# Patient Record
Sex: MALE | Race: WHITE | NOT HISPANIC OR LATINO | URBAN - METROPOLITAN AREA
[De-identification: names, ages, dates, MRNs, and addresses within clinical notes are randomized per-mention and may not be internally consistent; named-entity substitution may affect disease eponyms.]

---

## 2021-03-30 ENCOUNTER — INPATIENT (INPATIENT)
Facility: HOSPITAL | Age: 86
LOS: 7 days | Discharge: HOME HEALTH | DRG: 698 | End: 2021-04-07
Attending: INTERNAL MEDICINE | Admitting: INTERNAL MEDICINE
Payer: MEDICARE

## 2021-03-30 VITALS
DIASTOLIC BLOOD PRESSURE: 68 MMHG | SYSTOLIC BLOOD PRESSURE: 128 MMHG | RESPIRATION RATE: 26 BRPM | HEART RATE: 112 BPM | TEMPERATURE: 101 F | WEIGHT: 149.91 LBS | OXYGEN SATURATION: 95 %

## 2021-03-30 LAB
ALBUMIN SERPL ELPH-MCNC: 3.1 G/DL — LOW (ref 3.4–5)
ALP SERPL-CCNC: 126 U/L — HIGH (ref 40–120)
ALT FLD-CCNC: 27 U/L — SIGNIFICANT CHANGE UP (ref 12–42)
ANION GAP SERPL CALC-SCNC: 11 MMOL/L — SIGNIFICANT CHANGE UP (ref 9–16)
APPEARANCE UR: SIGNIFICANT CHANGE UP
APTT BLD: 31.4 SEC — SIGNIFICANT CHANGE UP (ref 27.5–35.5)
AST SERPL-CCNC: 24 U/L — SIGNIFICANT CHANGE UP (ref 15–37)
B PERT DNA SPEC QL NAA+PROBE: SIGNIFICANT CHANGE UP
BACTERIA # UR AUTO: ABNORMAL /HPF
BASOPHILS # BLD AUTO: 0.03 K/UL — SIGNIFICANT CHANGE UP (ref 0–0.2)
BASOPHILS NFR BLD AUTO: 0.3 % — SIGNIFICANT CHANGE UP (ref 0–2)
BILIRUB SERPL-MCNC: 0.5 MG/DL — SIGNIFICANT CHANGE UP (ref 0.2–1.2)
BILIRUB UR-MCNC: NEGATIVE — SIGNIFICANT CHANGE UP
BUN SERPL-MCNC: 19 MG/DL — SIGNIFICANT CHANGE UP (ref 7–23)
C PNEUM DNA SPEC QL NAA+PROBE: SIGNIFICANT CHANGE UP
CALCIUM SERPL-MCNC: 9.3 MG/DL — SIGNIFICANT CHANGE UP (ref 8.5–10.5)
CHLORIDE SERPL-SCNC: 104 MMOL/L — SIGNIFICANT CHANGE UP (ref 96–108)
CO2 SERPL-SCNC: 25 MMOL/L — SIGNIFICANT CHANGE UP (ref 22–31)
COLOR SPEC: YELLOW — SIGNIFICANT CHANGE UP
CREAT SERPL-MCNC: 1.33 MG/DL — HIGH (ref 0.5–1.3)
DIFF PNL FLD: ABNORMAL
EOSINOPHIL # BLD AUTO: 0.02 K/UL — SIGNIFICANT CHANGE UP (ref 0–0.5)
EOSINOPHIL NFR BLD AUTO: 0.2 % — SIGNIFICANT CHANGE UP (ref 0–6)
FLUAV H1 2009 PAND RNA SPEC QL NAA+PROBE: SIGNIFICANT CHANGE UP
FLUAV H1 RNA SPEC QL NAA+PROBE: SIGNIFICANT CHANGE UP
FLUAV H3 RNA SPEC QL NAA+PROBE: SIGNIFICANT CHANGE UP
FLUAV SUBTYP SPEC NAA+PROBE: SIGNIFICANT CHANGE UP
FLUBV RNA SPEC QL NAA+PROBE: SIGNIFICANT CHANGE UP
GLUCOSE SERPL-MCNC: 156 MG/DL — HIGH (ref 70–99)
GLUCOSE UR QL: NEGATIVE — SIGNIFICANT CHANGE UP
HADV DNA SPEC QL NAA+PROBE: SIGNIFICANT CHANGE UP
HCOV PNL SPEC NAA+PROBE: SIGNIFICANT CHANGE UP
HCT VFR BLD CALC: 48.5 % — SIGNIFICANT CHANGE UP (ref 39–50)
HGB BLD-MCNC: 16.2 G/DL — SIGNIFICANT CHANGE UP (ref 13–17)
HMPV RNA SPEC QL NAA+PROBE: SIGNIFICANT CHANGE UP
HPIV1 RNA SPEC QL NAA+PROBE: SIGNIFICANT CHANGE UP
HPIV2 RNA SPEC QL NAA+PROBE: SIGNIFICANT CHANGE UP
HPIV3 RNA SPEC QL NAA+PROBE: SIGNIFICANT CHANGE UP
HPIV4 RNA SPEC QL NAA+PROBE: SIGNIFICANT CHANGE UP
IMM GRANULOCYTES NFR BLD AUTO: 0.4 % — SIGNIFICANT CHANGE UP (ref 0–1.5)
INR BLD: 1.05 — SIGNIFICANT CHANGE UP (ref 0.88–1.16)
KETONES UR-MCNC: NEGATIVE — SIGNIFICANT CHANGE UP
LACTATE SERPL-SCNC: 2 MMOL/L — SIGNIFICANT CHANGE UP (ref 0.4–2)
LACTATE SERPL-SCNC: 2.6 MMOL/L — HIGH (ref 0.4–2)
LEUKOCYTE ESTERASE UR-ACNC: ABNORMAL
LYMPHOCYTES # BLD AUTO: 1.55 K/UL — SIGNIFICANT CHANGE UP (ref 1–3.3)
LYMPHOCYTES # BLD AUTO: 13.9 % — SIGNIFICANT CHANGE UP (ref 13–44)
MCHC RBC-ENTMCNC: 30.4 PG — SIGNIFICANT CHANGE UP (ref 27–34)
MCHC RBC-ENTMCNC: 33.4 GM/DL — SIGNIFICANT CHANGE UP (ref 32–36)
MCV RBC AUTO: 91 FL — SIGNIFICANT CHANGE UP (ref 80–100)
MONOCYTES # BLD AUTO: 0.54 K/UL — SIGNIFICANT CHANGE UP (ref 0–0.9)
MONOCYTES NFR BLD AUTO: 4.8 % — SIGNIFICANT CHANGE UP (ref 2–14)
NEUTROPHILS # BLD AUTO: 8.98 K/UL — HIGH (ref 1.8–7.4)
NEUTROPHILS NFR BLD AUTO: 80.4 % — HIGH (ref 43–77)
NITRITE UR-MCNC: POSITIVE
NRBC # BLD: 0 /100 WBCS — SIGNIFICANT CHANGE UP (ref 0–0)
NT-PROBNP SERPL-SCNC: 782 PG/ML — HIGH
PCO2 BLDV: 46 MMHG — SIGNIFICANT CHANGE UP (ref 42–55)
PH BLDV: 7.37 — SIGNIFICANT CHANGE UP (ref 7.32–7.43)
PH UR: 8.5 — HIGH (ref 5–8)
PLATELET # BLD AUTO: 215 K/UL — SIGNIFICANT CHANGE UP (ref 150–400)
PO2 BLDV: 38 MMHG — SIGNIFICANT CHANGE UP (ref 35–40)
POTASSIUM SERPL-MCNC: 3.3 MMOL/L — LOW (ref 3.5–5.3)
POTASSIUM SERPL-SCNC: 3.3 MMOL/L — LOW (ref 3.5–5.3)
PROT SERPL-MCNC: 7.9 G/DL — SIGNIFICANT CHANGE UP (ref 6.4–8.2)
PROT UR-MCNC: 30 MG/DL
PROTHROM AB SERPL-ACNC: 12.6 SEC — SIGNIFICANT CHANGE UP (ref 10.6–13.6)
RAPID RVP RESULT: SIGNIFICANT CHANGE UP
RBC # BLD: 5.33 M/UL — SIGNIFICANT CHANGE UP (ref 4.2–5.8)
RBC # FLD: 13.6 % — SIGNIFICANT CHANGE UP (ref 10.3–14.5)
RBC CASTS # UR COMP ASSIST: < 5 /HPF — SIGNIFICANT CHANGE UP
RSV RNA SPEC QL NAA+PROBE: SIGNIFICANT CHANGE UP
RV+EV RNA SPEC QL NAA+PROBE: SIGNIFICANT CHANGE UP
SAO2 % BLDV: 68 % — SIGNIFICANT CHANGE UP
SARS-COV-2 RNA SPEC QL NAA+PROBE: SIGNIFICANT CHANGE UP
SODIUM SERPL-SCNC: 140 MMOL/L — SIGNIFICANT CHANGE UP (ref 132–145)
SP GR SPEC: 1.01 — SIGNIFICANT CHANGE UP (ref 1–1.03)
TRI-PHOS CRY UR QL COMP ASSIST: ABNORMAL /HPF
TROPONIN I SERPL-MCNC: <0.017 NG/ML — LOW (ref 0.02–0.06)
UROBILINOGEN FLD QL: 0.2 E.U./DL — SIGNIFICANT CHANGE UP
WBC # BLD: 11.16 K/UL — HIGH (ref 3.8–10.5)
WBC # FLD AUTO: 11.16 K/UL — HIGH (ref 3.8–10.5)
WBC UR QL: > 10 /HPF

## 2021-03-30 PROCEDURE — 93010 ELECTROCARDIOGRAM REPORT: CPT

## 2021-03-30 PROCEDURE — 99223 1ST HOSP IP/OBS HIGH 75: CPT | Mod: GC

## 2021-03-30 PROCEDURE — 71045 X-RAY EXAM CHEST 1 VIEW: CPT | Mod: 26

## 2021-03-30 PROCEDURE — 99285 EMERGENCY DEPT VISIT HI MDM: CPT

## 2021-03-30 RX ORDER — AZITHROMYCIN 500 MG/1
500 TABLET, FILM COATED ORAL ONCE
Refills: 0 | Status: COMPLETED | OUTPATIENT
Start: 2021-03-30 | End: 2021-03-30

## 2021-03-30 RX ORDER — IPRATROPIUM/ALBUTEROL SULFATE 18-103MCG
3 AEROSOL WITH ADAPTER (GRAM) INHALATION ONCE
Refills: 0 | Status: COMPLETED | OUTPATIENT
Start: 2021-03-30 | End: 2021-03-30

## 2021-03-30 RX ORDER — TAMSULOSIN HYDROCHLORIDE 0.4 MG/1
0.4 CAPSULE ORAL AT BEDTIME
Refills: 0 | Status: DISCONTINUED | OUTPATIENT
Start: 2021-03-31 | End: 2021-03-31

## 2021-03-30 RX ORDER — METOPROLOL TARTRATE 50 MG
25 TABLET ORAL EVERY 12 HOURS
Refills: 0 | Status: DISCONTINUED | OUTPATIENT
Start: 2021-03-30 | End: 2021-03-30

## 2021-03-30 RX ORDER — CEFTRIAXONE 500 MG/1
1000 INJECTION, POWDER, FOR SOLUTION INTRAMUSCULAR; INTRAVENOUS EVERY 24 HOURS
Refills: 0 | Status: DISCONTINUED | OUTPATIENT
Start: 2021-03-31 | End: 2021-03-31

## 2021-03-30 RX ORDER — METOPROLOL TARTRATE 50 MG
12.5 TABLET ORAL EVERY 12 HOURS
Refills: 0 | Status: DISCONTINUED | OUTPATIENT
Start: 2021-03-30 | End: 2021-03-31

## 2021-03-30 RX ORDER — CEFTRIAXONE 500 MG/1
1000 INJECTION, POWDER, FOR SOLUTION INTRAMUSCULAR; INTRAVENOUS ONCE
Refills: 0 | Status: COMPLETED | OUTPATIENT
Start: 2021-03-30 | End: 2021-03-30

## 2021-03-30 RX ORDER — SODIUM CHLORIDE 9 MG/ML
2100 INJECTION INTRAMUSCULAR; INTRAVENOUS; SUBCUTANEOUS ONCE
Refills: 0 | Status: COMPLETED | OUTPATIENT
Start: 2021-03-30 | End: 2021-03-30

## 2021-03-30 RX ORDER — ACETAMINOPHEN 500 MG
650 TABLET ORAL ONCE
Refills: 0 | Status: COMPLETED | OUTPATIENT
Start: 2021-03-30 | End: 2021-03-30

## 2021-03-30 RX ORDER — HEPARIN SODIUM 5000 [USP'U]/ML
INJECTION INTRAVENOUS; SUBCUTANEOUS
Qty: 25000 | Refills: 0 | Status: DISCONTINUED | OUTPATIENT
Start: 2021-03-30 | End: 2021-03-31

## 2021-03-30 RX ADMIN — Medication 125 MILLIGRAM(S): at 11:19

## 2021-03-30 RX ADMIN — Medication 12.5 MILLIGRAM(S): at 22:29

## 2021-03-30 RX ADMIN — Medication 3 MILLILITER(S): at 12:45

## 2021-03-30 RX ADMIN — HEPARIN SODIUM 1200 UNIT(S)/HR: 5000 INJECTION INTRAVENOUS; SUBCUTANEOUS at 22:20

## 2021-03-30 RX ADMIN — CEFTRIAXONE 100 MILLIGRAM(S): 500 INJECTION, POWDER, FOR SOLUTION INTRAMUSCULAR; INTRAVENOUS at 11:19

## 2021-03-30 RX ADMIN — Medication 3 MILLILITER(S): at 11:19

## 2021-03-30 RX ADMIN — AZITHROMYCIN 255 MILLIGRAM(S): 500 TABLET, FILM COATED ORAL at 12:02

## 2021-03-30 RX ADMIN — SODIUM CHLORIDE 2100 MILLILITER(S): 9 INJECTION INTRAMUSCULAR; INTRAVENOUS; SUBCUTANEOUS at 11:19

## 2021-03-30 RX ADMIN — Medication 650 MILLIGRAM(S): at 11:19

## 2021-03-30 NOTE — H&P ADULT - NSHPPHYSICALEXAM_GEN_ALL_CORE
.  VITAL SIGNS:  T(C): 37 (03-30-21 @ 17:37), Max: 38.9 (03-30-21 @ 11:00)  T(F): 98.6 (03-30-21 @ 17:37), Max: 102 (03-30-21 @ 11:00)  HR: 101 (03-30-21 @ 17:37) (101 - 118)  BP: 145/66 (03-30-21 @ 17:37) (124/78 - 145/66)  BP(mean): --  RR: 18 (03-30-21 @ 17:37) (18 - 26)  SpO2: 95% (03-30-21 @ 17:37) (93% - 97%)  Wt(kg): --    PHYSICAL EXAM:    Constitutional: WDWN resting comfortably in bed; NAD  Head: NC/AT  Eyes: PERRL, EOMI, clear conjunctiva  ENT: no nasal discharge; uvula midline, no oropharyngeal erythema or exudates; MMM  Neck: supple; no JVD or thyromegaly  Respiratory: CTA B/L; no W/R/R, no retractions  Cardiac: +S1/S2; RRR; no M/R/G; PMI non-displaced  Gastrointestinal: soft, NT/ND; no rebound or guarding; +BSx4  Genitourinary: normal external genitalia  Back: spine midline, no bony tenderness or step-offs; no CVAT B/L  Extremities: WWP, no clubbing or cyanosis; no peripheral edema  Musculoskeletal: NROM x4; no joint swelling, tenderness or erythema  Vascular: 2+ radial, femoral, DP/PT pulses B/L  Dermatologic: skin warm, dry and intact; no rashes, wounds, or scars  Lymphatic: no submandibular or cervical LAD  Neurologic: AAOx3; CNII-XII grossly intact; no focal deficits  Psychiatric: affect and characteristics of appearance, verbalizations, behaviors are appropriate PHYSICAL EXAM:  Gen: Elderly male in NAD  Neuro: Alert, oriented x 1 (he thinks he is in [place]). Difficult to understand speech  Heart: Irregular. No rubs murmurs. HR 90-100s. S1S2 WNL.  Lungs: CTAB, no signs of increased WOB  Abd: Suprapubic catheter in place. Soft. BS+ in 4Q. ?Suprapubic tenderness  Extremities: Trace peripheral edema. Good distal pulses. Warm well perfused extremities.

## 2021-03-30 NOTE — H&P ADULT - HISTORY OF PRESENT ILLNESS
INCOMPLETE    86M PMH cognitive decline (non verbal at baseline), HTN, BPH with chronic indwelling suprapubic catheter (placed 1 month ago per HHA) BIBEMS from home accompanied by HHA for evaluation of AMS and SOB. Per HHA when she arrived this morning found patient to be SOB w/ increased respiratory effort. Per ED note pt unable to participate in HPI as nonverbal at baseline, HPI obtained mostly from HHA.  In the ED found to be in sever sepsis most likley 2/2 UTI vs CAP vs COVID as pt hypoxemic in ED requiring 6L NC->4L NC( 1st COVID swab negative, pending 2nd swab) and in new Afib w/ RVR. Transferred to [facility] COVID tele as high suspicion for COVID for further care    ED course:   VS T 101; 112; 128/68; 26; 95% on 6L NC->4L NC  Labs: WBC 11.16; Cr 1.33; Alk Phos 126; Pro ; COVID PCR - X1; UA + LE, WBC, bacteria  Imaging: CXR: no consolidations/ infiltrates; EKG: AFib w/RVR  Rx: 2L NC; CTX/Azithro; Solumderol 125mg X1; Duonebs    INCOMPLETE    85yo M w/ PMH cognitive decline (non verbal at baseline), HTN, BPH with chronic indwelling suprapubic catheter (placed 1 month ago per HHA) is BIBEMS from home accompanied by HHA for evaluation of AMS and SOB. Per HHA when she arrived this morning found patient to have SOB w/ increased respiratory effort. Per ED note pt unable to participate in HPI as nonverbal at baseline, HPI obtained mostly from HHA. In the ED found to be in sever sepsis most likley 2/2 UTI vs CAP vs COVID as pt hypoxemic in ED requiring 6L NC->4L NC( 1st COVID swab negative, pending 2nd swab) and in new Afib w/ RVR. Transferred to [facility] COVID tele as high suspicion for COVID for further care    ED course:   VS T 101; 112; 128/68; 26; 95% on 6L NC->4L NC  Labs: WBC 11.16; Cr 1.33; Alk Phos 126; Pro ; COVID PCR - X1; UA + LE, WBC, bacteria  Imaging: CXR: no consolidations/ infiltrates; EKG: AFib w/RVR  Rx: 2L NC; CTX/Azithro; Solumderol 125mg X1; Duonebs    INCOMPLETE    Patient unable to provide any history, limited history below obtained from the chart (ED provider spoke with HHA).    HPI: 85yo M w/ PMH cognitive decline (non verbal at baseline), HTN, BPH with chronic indwelling suprapubic catheter (placed 1 month ago per HHA) was BIBEMS from home accompanied by HHA for evaluation of shortness of breath, which started today in the morning. No cough/fevers, no recent hospitalization. Patient is a daniels of the [facility], he is alert and oriented x 1 at baseline, on arrival as per HHA mental status at baseline.    Upon my assessment, he appears comfortable      Home meds:  Tamsulosin 0.4 mg daily  Omeprazole 20mg daily  Colace/Senna  Amlodipine 10 m daily  ASA 81 andrew daily  Atorvastatin             ED course:   VS T 101; 112; 128/68; 26; 95% on 6L NC->4L NC  Labs: WBC 11.16; Cr 1.33; Alk Phos 126; Pro ; COVID PCR - X1; UA + LE, WBC, bacteria  Imaging: CXR: no consolidations/ infiltrates; EKG: AFib w/RVR  Rx: 2L NC; CTX/Azithro; Solumderol 125mg X1; Duonebs    Patient unable to provide any history, limited history below obtained from the chart (ED provider spoke with A, whom writer was unable to reach).    HPI: 85yo M w/ PMH cognitive decline (non verbal at baseline), HTN, BPH with chronic indwelling suprapubic catheter (placed 1 month ago per HHA) was BIBEMS from home accompanied by A for evaluation of shortness of breath, which started today in the morning. No cough/fevers, no recent hospitalization. 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    Upon my assessment, he appears comfortable. He thinks he is at [place]. He intermittently answers simple questions. Denies SOB, possibly admits to abdominal pain.     ED course:  VS T 101; 112; 128/68; 26; 95% on 6L NC->4L NC  Labs: WBC 11.16; Cr 1.33; Alk Phos 126; Pro ; COVID PCR negative X1; UA + LE, WBC, bacteria  Imaging: CXR: no consolidations/ infiltrates; EKG: AFib w/RVR  Course: 2L NC; CTX/Azithro; Solumderol 125mg X1; Duonebs (given wheezes on exam in ED)       Home meds:  Tamsulosin 0.4 mg daily  Omeprazole 20mg daily  Colace/Senna  Amlodipine 10 m daily  ASA 81 andrew daily  Atorvastatin     SH:   Allergies:   Patient unable to provide any history, limited history below obtained from the chart (ED provider spoke with A, whom writer was unable to reach).    HPI: 87yo M w/ PMH cognitive decline (non verbal at baseline), HTN, BPH with chronic indwelling suprapubic catheter (placed 1 month ago per HHA) was BIBEMS from home accompanied by A for evaluation of shortness of breath, which started today in the morning. No cough/fevers, no recent hospitalization. Apparently patient was also initially ?altered, but later in the ED his mental status returned to baseline, which is alert and oriented x 1, he is [place] state.     Upon my assessment, he appears comfortable. He thinks he is at [place]. He intermittently answers simple questions. Denies SOB, possibly admits to abdominal pain.     ED course:  VS T 101; 112; 128/68; 26; 95% on 6L NC->4L NC  Labs: WBC 11.16; Cr 1.33; Alk Phos 126; Pro ; COVID PCR negative X1; UA + LE, WBC, bacteria  Imaging: CXR: no consolidations/ infiltrates; EKG: AFib w/RVR  Course: 2L NC; CTX/Azithro; Solumderol 125mg X1; Duonebs (given wheezes on exam in ED)       Home meds:  Tamsulosin 0.4 mg daily  Omeprazole 20mg daily  Colace/Senna  Amlodipine 10 m daily  ASA 81 andrew daily  Atorvastatin     SH: [place]. Unable to obtain proper history, ?he used to smoke.   Allergies: Unable to obtain, NKDA   Patient unable to provide any history, limited history below obtained from the chart (ED provider spoke with A, whom writer was unable to reach).    HPI: 87yo M w/ PMH of Dementia(AAO x ?1 at baseline), HTN, BPH with chronic indwelling suprapubic catheter (placed 1 month ago per HHA) was BIBEMS from home accompanied by A for evaluation of shortness of breath, which started today in the morning. No cough/fevers, no recent hospitalization. Apparently patient was also initially ?altered, but later in the ED his mental status returned to baseline, which is alert and oriented x 1, he is [place] state.     Upon my assessment, he appears comfortable. He thinks he is at [place]. He intermittently answers simple questions. Denies SOB, possibly admits to abdominal pain.     ED course:  VS T 101; 112; 128/68; 26; 95% on 6L NC->4L NC  Labs: WBC 11.16; Cr 1.33; Alk Phos 126; Pro ; COVID PCR negative X1; UA + LE, WBC, bacteria  Imaging: CXR: no consolidations/ infiltrates; EKG: AFib w/RVR  Course: 2L NC; CTX/Azithro; Solumedrol 125mg X1; Duonebs (given wheezes on exam in ED)       Home meds:  Tamsulosin 0.4 mg daily  Omeprazole 20mg daily  Colace/Senna  Amlodipine 10 m daily  ASA 81 andrew daily  Atorvastatin     SH: [place]. Unable to obtain proper history, ?he used to smoke.   Allergies: Unable to obtain, NKDA   Patient unable to provide any history, limited history below obtained from the chart (ED provider spoke with A, whom writer was unable to reach).    HPI: 85yo M w/ PMH of Dementia(AAO x ?1 at baseline), HTN, BPH with chronic indwelling suprapubic catheter (placed 1 month ago per HHA), ??AF (script for Xarelto in Surescripts) was BIBEMS from home accompanied by A for evaluation of shortness of breath, which started today in the morning. No cough/fevers, no recent hospitalization. Apparently patient was also initially ?altered, but later in the ED his mental status returned to baseline, which is alert and oriented x 1, he is [place] state.     Upon my assessment, he appears comfortable. He thinks he is at [place]. He intermittently answers simple questions. Denies SOB, possibly admits to abdominal pain.     ED course:  VS T 101; 112; 128/68; 26; 95% on 6L NC->4L NC  Labs: WBC 11.16; Cr 1.33; Alk Phos 126; Pro ; COVID PCR negative X1; UA + LE, WBC, bacteria  Imaging: CXR: no consolidations/ infiltrates; EKG: AFib w/RVR  Course: 2L NC; CTX/Azithro; Solumedrol 125mg X1; Duonebs (given wheezes on exam in ED)       Home meds:    based on ED note:    Tamsulosin 0.4 mg daily  Omeprazole 20mg daily  Colace/Senna  Amlodipine 10 mg daily  ASA 81 mg daily  Atorvastatin    Surescript:  Xarelto 20m daily      SH: [place]. Unable to obtain proper history, ?he used to smoke.   Allergies: Unable to obtain, as per ED "NKDA"   Patient unable to provide any history, limited history below obtained from the chart (ED provider spoke with A, whom writer was unable to reach).    HPI: 87yo M w/ PMH of Dementia(AAO x ?1 at baseline), HTN, BPH with chronic indwelling suprapubic catheter (placed 1 month ago per HHA), ??AF (script for Xarelto in Surescripts) was BIBEMS from home accompanied by A for evaluation of shortness of breath, which started today in the morning. No cough/fevers, no recent hospitalization. Apparently patient was also initially ?altered, but later in the ED his mental status returned to baseline, which is alert and oriented x 1, he is [place] state.     Upon my assessment, he appears comfortable. He thinks he is at [place]. He intermittently answers simple questions. Denies SOB, possibly admits to abdominal pain.     ED course:  VS T 101; 112; 128/68; 26; 95% on 6L NC->4L NC  Labs: WBC 11.16; Cr 1.33; Alk Phos 126; Pro ; COVID PCR negative X1; UA + LE, WBC, bacteria  Imaging: CXR: no consolidations/ infiltrates; EKG: AFib w/RVR  Course: 2L NC; CTX/Azithro; Solumedrol 125mg X1; Duonebs (given wheezes on exam in ED)       Home meds:    based on ED note:    Tamsulosin 0.4 mg daily  Omeprazole 20mg daily  Colace/Senna  Amlodipine 10 mg daily  ASA 81 mg daily  Atorvastatin    Surescript:  Xarelto 20m daily      SH: [place]. Unable to obtain proper history, ?he used to smoke.  Given limited history and no reliable med rec, will need collaterals during the day. [phone]. [phone] Nurse Aleksander who is familiar with patient,  [place] [phone].    Allergies: Unable to obtain, as per ED "NKDA"

## 2021-03-30 NOTE — ED PROVIDER NOTE - CLINICAL SUMMARY MEDICAL DECISION MAKING FREE TEXT BOX
sepsis, source UTI vs CAP, new onset afib, pancultured, IV antibiotics, IVF. resp effort improved after nebs/steroids. discussed with Dr. Bardales, admit to [facility], will hold off on AC at this time after discussion with Dr. Bardales.

## 2021-03-30 NOTE — H&P ADULT - NSHPREVIEWOFSYSTEMS_GEN_ALL_CORE
REVIEW OF SYSTEMS:    CONSTITUTIONAL: No weakness, fevers or chills  EYES/ENT: No visual changes;  No vertigo or throat pain   NECK: No pain or stiffness  RESPIRATORY: No cough, wheezing, hemoptysis; No shortness of breath  CARDIOVASCULAR: No chest pain or palpitations  GASTROINTESTINAL: No abdominal or epigastric pain. No nausea, vomiting, or hematemesis; No diarrhea or constipation. No melena or hematochezia.  GENITOURINARY: No dysuria, frequency or hematuria  NEUROLOGICAL: No numbness or weakness  SKIN: No itching, burning, rashes, or lesions   All other review of systems is negative unless indicated above. Unable to obtain full ROS.

## 2021-03-30 NOTE — ED ADULT NURSE NOTE - CHPI ED NUR SYMPTOMS POS
pt is audibly wheezing and tachypneic on arrival/CHEST CONGESTION/DIFFICULTY BREATHING/SHORTNESS OF BREATH/WHEEZING

## 2021-03-30 NOTE — H&P ADULT - ATTENDING COMMENTS
I evaluated patient at the bedside with resident physician.     Impression: 86 year old male with baseline dementia who presents with acute encephalopathy secondary to severe sepsis. He has chronic suprapubic catheter and a UA concerning for UTI although may be colonized. According to home health aid, patient's mentation has acutely changed and is probably related to an underlying infection in conjunction with a baseline dementia. Covid-19 to be ruled out, 1x negative thus far. CXR showing poor inspiration, mild b/l pleural effusions w/ +/- airspace disease.     Agree with current management.    Once 2nd covid-19 swab is resulted, if negative will transfer patient to a non-covid unit.

## 2021-03-30 NOTE — ED ADULT NURSE NOTE - OBJECTIVE STATEMENT
Pt aox 1, able tostate his name and birthday but unable to states place or year. HHA states this is his baseline. Pt is agitated on arrival but hha states this is his baseline. Pt is audibly wheezing and tachypneic on arrival. as per ems oxygen low when arrived at his home.

## 2021-03-30 NOTE — ED PROVIDER NOTE - CARE PLAN
Principal Discharge DX:	Sepsis  Secondary Diagnosis:	Pneumonia   Principal Discharge DX:	Sepsis  Secondary Diagnosis:	Pneumonia  Secondary Diagnosis:	UTI (urinary tract infection)  Secondary Diagnosis:	Atrial fibrillation

## 2021-03-30 NOTE — H&P ADULT - NSHPLABSRESULTS_GEN_ALL_CORE
.  LABS:                         16.2   11.16 )-----------( 215      ( 30 Mar 2021 11:20 )             48.5         140  |  104  |  19  ----------------------------<  156<H>  3.3<L>   |  25  |  1.33<H>    Ca    9.3      30 Mar 2021 11:20    TPro  7.9  /  Alb  3.1<L>  /  TBili  0.5  /  DBili  x   /  AST  24  /  ALT  27  /  AlkPhos  126<H>      PT/INR - ( 30 Mar 2021 11:20 )   PT: 12.6 sec;   INR: 1.05        PTT - ( 30 Mar 2021 11:20 )  PTT:31.4 sec      Urinalysis Basic - ( 30 Mar 2021 11:34 )  Color: Yellow / Appearance: SL CLOUDY / S.015 / pH: x  Gluc: x / Ketone: NEGATIVE  / Bili: NEGATIVE / Urobili: 0.2 E.U./dL   Blood: x / Protein: 30 mg/dL / Nitrite: POSITIVE   Leuk Esterase: Moderate / RBC: < 5 /HPF / WBC > 10 /HPF   Sq Epi: x / Non Sq Epi: x / Bacteria: Many /HPF      CARDIAC MARKERS ( 30 Mar 2021 11:20 )  <0.017 ng/mL / x     / x     / x     / x          Serum Pro-Brain Natriuretic Peptide: 782 pg/mL ( @ 11:20)    Lactate, Blood: 2.0 mmoL/L ( @ 14:41)  Lactate, Blood: 2.6 mmoL/L ( @ 11:20)      RADIOLOGY, EKG & ADDITIONAL TESTS: Reviewed.

## 2021-03-30 NOTE — ED PROVIDER NOTE - OBJECTIVE STATEMENT
87 yo male BIBEMS from home accompanied by HHA, cognitive decline, HTN, "prostate issues" with indwelling suprapubic catheter presents for evaluation of shortness of breath today. HHA arrived this morning and found patient to be short of breath with increased respiratory effort. patient unable to participate in HPI due to nonverbal at baseline, HPI obtained mostly from HHA/agency. denies cough/fever. no recent hospitalizations.     [facility] [phone]. [phone] Nurse Aleksander who is familiar with patient,  [place] [phone]  home meds - tamsulosin 0.4mg daily, omeprazole 20mg daily, colace/senna, amlodipine 10mg daily, ASA 81mg daily, atorvastatin, calcium, mvi  NKDA

## 2021-03-30 NOTE — ED ADULT TRIAGE NOTE - CHIEF COMPLAINT QUOTE
here for sob/ams, fever x 1 day- is awake and alert and as per HHA pt is " baseline" Pt was found by HHA to have a fever, sob, hypoxic- Arrives with [place] Catheter that has been in place for 1 month-

## 2021-03-30 NOTE — H&P ADULT - ASSESSMENT
NEURO  #Acute Metabolic Encephalopathy most likely 2/2 sever sepsis (source UTI vs CAP vs COVID (1st swab negative, pending 2nd swab). S/p CTX/Azithro in the ED. UA + WBC, bacteria, LE  -f/u Ucx  -c/w CTX 1g X 5 days   -c/t monitor     #Cognitive decline (non verbal at baseline)  -provide assisatnce w/ ADLs     RESPIRATORY  #R/O COVID as hypoxic in the ED requiring 6L NC. COVID swab neg X1, pending 2ndCOVID swab results. s/p Solumderol 125 mg and Duonebs in the ED  -c/w supplamental O2, wean off as tolerated  -f/u COVID swab     #CAP. CXR w/out acute consolidations. S/p CTX/Azithro in the ED  -f/u Procal   -f/u urine Legionella and Strep Ag  -c/w CTX X 5 days and Azithrox 3 days    CARDIO  #New onset Afib w/ RVR most likely 2/2 sever sepsis (sources UTI vs CAP vs COVID)  -treat the underlying cause   -Hep gtt, f/u PTT  -Lopressor 12.5 BID as needed    #HTN. Home med: Norvasc 10  -hold in the setting of sepsis, resume as needed    #HLD. Home med: Atorvastatin   -c/w home med    GI  PPX: PPI and bowel regiment       #SIENNA most likley pre-renal in the setting of sever sepsis   -f/u urine lytes   -Bladder scan to r/o retention  -renally dose meds/ avoid nephrotoxins    #BPH w/ chronic suprapubic catheter (catheter placed 1 month ago per A)  -consider urology consult and change suprapubic catheter in the setting of UTI  -CTX 1g X 5 days   -c/w Flomax  -Bladder scan to r/o retention      #ID   Sever Sepsis (3/4 SIRS on admission T 101, , WBC 11; LA 2.6->2) most likley 2/2 UTI vs CAP vs COVID PNA (1st swab neg, pending 2nd swab). S/p 2L NS, CTX/Azithro in the ED   -c/w CTX and Azithro  -consider IVF boluses prn   -consider urology consult and change suprapubic catheter in the setting of UTI      #UTI. UA + LE, WBC, bacteria. s/p CTX in the ED  -f/u Ucx  -c/w CTX 1g X 5 days   -consider urology consult and change suprapubic catheter in the setting of UTI    #CAP   -f/u procal  -f/u urine Legionella and Strep ag   -c/w CTX X 5 days and Azithro x 3 days     #R/o COVID in the setting of Hypoxemia   -f/u 2nd COVID swab    Heme   #Leukocytosis 2/2 sepsis   -see above for plan     F -   E -   N -   A -     Dispo: COVID Tele 3Wo 85yo M with PMH of Dementia (AAO x ?1 at baseline), HTN, BPH with chronic indwelling suprapubic catheter was BIBEMS with complaint of SOB and ?AMS, and was found to be in acute hypoxic respiratory failure with ED exam notable for wheezes, AFib with RVR and sepsis likely 2/2 UTI. Now s/p 1 x nebs saturating 93-95% on RA, mental status at baseline, Afib in 90s-100s.       ID  # Severe sepsis. Technically would meet 2/4 SIRS (febrile to 38.2 in ED + HR), + lactate would make it severe sepsis. qSOFA 1 (?AMS). Sepsis likely 2/2 UTI as below. S/P fluid resuscitation    # UTI i/s/o indwelling suprapubic catheter, suprapubic tenderness, positive UA. Now afebrile after Ceftriaxone started.   - c/w Ceftriaxone 1g x 5 days  - follow urine cultures[ ]   - follow blood cultures [ ]     # R/O COVID as under respiratory      RESPIRATORY  # Acute hypoxic respiratory failure  Unclear cause of initial hypoxia (95% on 4L), now saturating 93-95% on RA. Initial exam in ED notable for wheezes, which we do not appreciate, but he received 1 x nebs and 125mg of solumedrol. Xray with no infiltrates, but possible bilateral  H Given good respiratory status at this moment, we'll hold off further steroids now.    DD:  1. Bronchoconstriction 2/2 COPD i/s/o smoking history. Rapid response to bronchodilators would favor this diagnosis  2. Fluid overload i/s/o new onset of AF   3. R/O COVID - 1 swab negative, 2nd penidng    Plan:  - Duoneb Q4H PRN   - supplement oxyen if needed  - f/u COVID swab      NEURO. Now mental status at baseline. Initially reportedly AMS.     #Acute Metabolic Encephalopathy most likely 2/2 severe sepsis. Now resolved.     #Dementia. At baseline oriented x 1. Able to answer simple questions. Unclear if he ever had any work-up      CARDIO  #New onset Afib w/ RVR most likely 2/2 acute infection  - treat the underlying cause   - Hep gtt, f/u PTT in am   - Lopressor 12.5 BID    #HTN. Home med: Norvasc 10  - now normotensive, resume as tolerated    #HLD  -c/w Lipitor    GI. No acute issues      #SIENNA vs CKD vs SIENNA on CKD. Unknown baseline.   -f/u urine lytes   -renally dose meds/ avoid nephrotoxins    #BPH w/ chronic suprapubic catheter (catheter placed 1 month ago per A)  - consider urology consult in am and change suprapubic catheter in the setting of UTI [ ]   - will hold flomax given suprapubic cathether      #ID   Sever Sepsis (3/4 SIRS on admission T 101, , WBC 11; LA 2.6->2) most likley 2/2 UTI vs CAP vs COVID PNA (1st swab neg, pending 2nd swab). S/p 2L NS, CTX/Azithro in the ED   -c/w CTX and Azithro  -consider IVF boluses prn   -consider urology consult and change suprapubic catheter in the setting of UTI      #UTI. UA + LE, WBC, bacteria. s/p CTX in the ED  -f/u Ucx  -c/w CTX 1g X 5 days   -consider urology consult and change suprapubic catheter in the setting of UTI    #CAP   -f/u procal  -f/u urine Legionella and Strep ag   -c/w CTX X 5 days and Azithro x 3 days     #R/o COVID in the setting of Hypoxemia   -f/u 2nd COVID swab    Heme   #Leukocytosis 2/2 sepsis   -see above for plan     F -   E -   N -   A -     Dispo: COVID Tele 3Wo 85yo M with PMH of Dementia (AAO x ?1 at baseline), HTN, ??AF (script for Xarelto in surescripts) BPH with chronic suprapubic catheter was BIBEMS with complaint of SOB and ?AMS, and was found to be in acute hypoxic respiratory failure with ED exam notable for wheezes, AFib with RVR and sepsis likely 2/2 UTI. Now s/p 1 x nebs saturating 93-95% on RA, mental status at baseline, Afib in 90s-100s.     Given limited history and no reliable med rec, will need     ID  # Severe sepsis. Technically would meet 2/4 SIRS (febrile to 38.2 in ED + HR), + lactate would make it severe sepsis. qSOFA 1 (?AMS). Sepsis likely 2/2 UTI as below. S/P fluid resuscitation    # UTI i/s/o indwelling suprapubic catheter, suprapubic tenderness, positive UA. Now afebrile after Ceftriaxone started.   - c/w Ceftriaxone 1g x 5 days  - follow urine cultures[ ]   - follow blood cultures [ ]     # R/O COVID as under respiratory      RESPIRATORY  # Acute hypoxic respiratory failure  Unclear cause of initial hypoxia (95% on 4L), now saturating 93-95% on RA. Initial exam in ED notable for wheezes, which we do not appreciate, but he received 1 x nebs and 125mg of solumedrol. Xray with no infiltrates, but possible bilateral  H Given good respiratory status at this moment, we'll hold off further steroids now.    DD:  1. Bronchoconstriction 2/2 COPD i/s/o smoking history. Rapid response to bronchodilators would favor this diagnosis  2. Fluid overload i/s/o new onset of AF   3. R/O COVID - 1 swab negative, 2nd penidng    Plan:  - Duoneb Q4H PRN   - supplement oxyen if needed  - f/u COVID swab      NEURO. Now mental status at baseline. Initially reportedly AMS.     #Acute Metabolic Encephalopathy most likely 2/2 severe sepsis. Now resolved.     #Dementia. At baseline oriented x 1. Able to answer simple questions. Unclear if he ever had any work-up      CARDIO  #Afib w/ RVR most likely 2/2 acute infection. ?New onset vs known Afib given script for Xarelto found in Superscripts  Now HR in 90s-100s. Treatment of infection will help with rate.  - Hep gtt, f/u PTT in am, tomorrow likely switch to NOACS  - Lopressor 12.5 BID for now    #HTN. Home med: Norvasc 10  - now normotensive, resume as tolerated    #HLD  -     GI. No acute issues      #SIENNA vs CKD vs SIENNA on CKD. Unknown baseline.   -f/u urine lytes   -renally dose meds/ avoid nephrotoxins    #BPH w/ chronic suprapubic catheter (catheter placed 1 month ago per A)  - consider urology consult in am and change suprapubic catheter in the setting of UTI [ ]   - will hold flomax given suprapubic cathether      #ID   Sever Sepsis (3/4 SIRS on admission T 101, , WBC 11; LA 2.6->2) most likley 2/2 UTI vs CAP vs COVID PNA (1st swab neg, pending 2nd swab). S/p 2L NS, CTX/Azithro in the ED   -c/w CTX and Azithro  -consider IVF boluses prn   -consider urology consult and change suprapubic catheter in the setting of UTI      #UTI. UA + LE, WBC, bacteria. s/p CTX in the ED  -f/u Ucx  -c/w CTX 1g X 5 days   -consider urology consult and change suprapubic catheter in the setting of UTI    #CAP   -f/u procal  -f/u urine Legionella and Strep ag   -c/w CTX X 5 days and Azithro x 3 days     #R/o COVID in the setting of Hypoxemia   -f/u 2nd COVID swab    Heme   #Leukocytosis 2/2 sepsis   -see above for plan     F -   E -   N -   A -     Dispo: COVID Tele 3Wo 87yo M with PMH of Dementia (AAO x ?1 at baseline), HTN, ??AF (script for Xarelto in surescripts) BPH with chronic suprapubic catheter was BIBEMS with complaint of SOB and ?AMS, and was found to be in acute hypoxic respiratory failure with ED exam notable for wheezes, AFib with RVR and sepsis likely 2/2 UTI. Now s/p 1 x nebs saturating 93-95% on RA, mental status at baseline, Afib in 90s-100s.     Given limited history and no reliable med rec, will need collaterals during the day. [phone]. [phone] Nurse Aleksander who is familiar with patient,  [place] [phone].    ID  # Severe sepsis. Technically would meet 2/4 SIRS (febrile to 38.2 in ED + HR), + lactate would make it severe sepsis. qSOFA 1 (?AMS). Sepsis likely 2/2 UTI as below. S/P fluid resuscitation    # UTI i/s/o indwelling suprapubic catheter, suprapubic tenderness, positive UA. Now afebrile after Ceftriaxone started.   - c/w Ceftriaxone 1g x 5 days  - follow urine cultures[ ]   - follow blood cultures [ ]     # R/O COVID as under respiratory      RESPIRATORY  # Acute hypoxic respiratory failure  Unclear cause of initial hypoxia (95% on 4L), now saturating 93-95% on RA. Initial exam in ED notable for wheezes, which we do not appreciate, but he received 1 x nebs and 125mg of solumedrol. Xray with no infiltrates, but possible bilateral  H Given good respiratory status at this moment, we'll hold off further steroids now.    DD:  1. Bronchoconstriction 2/2 COPD i/s/o smoking history. Rapid response to bronchodilators would favor this diagnosis  2. Fluid overload i/s/o new onset of AF   3. R/O COVID - 1 swab negative, 2nd penidng    Plan:  - Duoneb Q4H PRN   - supplement oxyen if needed  - f/u COVID swab      NEURO. Now mental status at baseline. Initially reportedly AMS.     #Acute Metabolic Encephalopathy most likely 2/2 severe sepsis. Now resolved.     #Dementia. At baseline oriented x 1. Able to answer simple questions. Unclear if he ever had any work-up      CARDIO  #Afib w/ RVR most likely 2/2 acute infection. ?New onset vs known Afib given script for Xarelto found in Superscripts  Now HR in 90s-100s. Treatment of infection will help with rate.  - Hep gtt, f/u PTT in am, tomorrow likely switch to NOACS  - Lopressor 12.5 BID for now    #HTN. Home med: Norvasc 10  - now normotensive, resume as tolerated    #?HLD  - restart statins after med rec    GI. No acute issues      #SIENNA vs CKD vs SIENNA on CKD. Unknown baseline.   -f/u urine lytes   -renally dose meds/ avoid nephrotoxins    #BPH w/ chronic suprapubic catheter (catheter placed 1 month ago per A)  - consider urology consult in am and change suprapubic catheter in the setting of UTI [ ]   - will hold flomax given suprapubic cathether    Heme . No acute issues    F - s/p 2.1 L   E - replete K >4, Mag >3, Phos > 2   N - DASH diet   A - Heparin drip for AF    Dispo: COVID Tele 3Wo 85yo M with PMH of Dementia (AAO x ?1 at baseline), HTN, ??AF (script for Xarelto in surescripts) BPH with chronic suprapubic catheter was BIBEMS with complaint of SOB and ?AMS, and was found to be in acute hypoxic respiratory failure with ED exam notable for wheezes, AFib with RVR and sepsis likely 2/2 UTI. Now s/p 1 x nebs saturating 93-95% on RA, mental status at baseline, Afib in 90s-100s.       ID  # Severe sepsis. Technically would meet 2/4 SIRS (febrile to 38.2 in ED + HR), + lactate would make it severe sepsis. qSOFA 1 (?AMS). Sepsis likely 2/2 UTI as below. S/P fluid resuscitation    # UTI i/s/o indwelling suprapubic catheter, suprapubic tenderness, positive UA. Now afebrile after Ceftriaxone started.   - c/w Ceftriaxone 1g x 5 days  - follow urine cultures[ ]   - follow blood cultures [ ]     # R/O COVID as under respiratory      RESPIRATORY  # Acute hypoxic respiratory failure  Unclear cause of initial hypoxia (95% on 4L), now saturating 93-95% on RA. Initial exam in ED notable for wheezes, which we do not appreciate, but he received 1 x nebs and 125mg of solumedrol. Xray with no infiltrates, but possible bilateral  H Given good respiratory status at this moment, we'll hold off further steroids now.    DD:  1. Bronchoconstriction 2/2 COPD i/s/o smoking history. Rapid response to bronchodilators would favor this diagnosis  2. Fluid overload i/s/o new onset of AF   3. R/O COVID - 1 swab negative, 2nd penidng    Plan:  - Duoneb Q4H PRN   - supplement oxyen if needed  - f/u COVID swab      NEURO. Now mental status at baseline. Initially reportedly AMS.     #Acute Metabolic Encephalopathy most likely 2/2 severe sepsis. Now resolved.     #Dementia. At baseline oriented x 1. Able to answer simple questions. Unclear if he ever had any work-up      CARDIO  #Afib w/ RVR most likely 2/2 acute infection. ?New onset vs known Afib given script for Xarelto found in Superscripts  Now HR in 90s-100s. Treatment of infection will help with rate.  - Hep gtt, f/u PTT in am, tomorrow likely switch to NOACS  - Lopressor 12.5 BID for now    #HTN. Home med: Norvasc 10  - now normotensive, resume as tolerated    #?HLD  - restart statins after med rec    GI. No acute issues      #SIENNA vs CKD vs SIENNA on CKD. Unknown baseline.   -f/u urine lytes   -renally dose meds/ avoid nephrotoxins    #BPH w/ chronic suprapubic catheter (catheter placed 1 month ago per A)  - consider urology consult in am and change suprapubic catheter in the setting of UTI [ ]   - will hold flomax given suprapubic cathether    Heme . No acute issues    F - s/p 2.1 L   E - replete K >4, Mag >3, Phos > 2   N - DASH diet   A - Heparin drip for AF    Given limited history and no reliable med rec, will need collaterals during the day. [phone]. [phone] Nurse Aleksander who is familiar with patient,  [place] [phone].    Dispo: COVID Tele 3Wo

## 2021-03-30 NOTE — ED PROVIDER NOTE - PHYSICAL EXAMINATION
CONSTITUTIONAL: frail, cachetic; in no apparent distress.   	HEAD: Normocephalic; atraumatic.   	EYES:  conjunctiva and sclera clear  	ENT: normal nose; no rhinorrhea; normal pharynx with no erythema or lesions.   	NECK: Supple; non-tender;   	CARDIOVASCULAR: Normal S1, S2; no murmurs, rubs, or gallops. Regular rate and rhythm.   	RESPIRATORY: Breathing easily; +expiratory wheezing throughout.   	GI: Soft; non-distended; non-tender  	EXT: No cyanosis or edema; N/V intact  	SKIN: Normal for age and race; warm; dry; good turgor; no apparent lesions or rash.   	NEURO: able to tell me his name, follows commands, moving all extremities, gargled speech. at baseline as per HHA.

## 2021-03-30 NOTE — ED PROVIDER NOTE - ATTENDING CONTRIBUTION TO CARE
I have seen the pt, reviewed all pertinent clinical data, and I agree with the documentation/care/plan executed by WAGNER Keane

## 2021-03-31 DIAGNOSIS — N39.0 URINARY TRACT INFECTION, SITE NOT SPECIFIED: ICD-10-CM

## 2021-03-31 DIAGNOSIS — N40.0 BENIGN PROSTATIC HYPERPLASIA WITHOUT LOWER URINARY TRACT SYMPTOMS: ICD-10-CM

## 2021-03-31 DIAGNOSIS — F03.90 UNSPECIFIED DEMENTIA, UNSPECIFIED SEVERITY, WITHOUT BEHAVIORAL DISTURBANCE, PSYCHOTIC DISTURBANCE, MOOD DISTURBANCE, AND ANXIETY: ICD-10-CM

## 2021-03-31 DIAGNOSIS — I48.92 UNSPECIFIED ATRIAL FLUTTER: ICD-10-CM

## 2021-03-31 DIAGNOSIS — I10 ESSENTIAL (PRIMARY) HYPERTENSION: ICD-10-CM

## 2021-03-31 DIAGNOSIS — R63.8 OTHER SYMPTOMS AND SIGNS CONCERNING FOOD AND FLUID INTAKE: ICD-10-CM

## 2021-03-31 DIAGNOSIS — J44.1 CHRONIC OBSTRUCTIVE PULMONARY DISEASE WITH (ACUTE) EXACERBATION: ICD-10-CM

## 2021-03-31 DIAGNOSIS — A41.9 SEPSIS, UNSPECIFIED ORGANISM: ICD-10-CM

## 2021-03-31 DIAGNOSIS — I48.91 UNSPECIFIED ATRIAL FIBRILLATION: ICD-10-CM

## 2021-03-31 DIAGNOSIS — J96.90 RESPIRATORY FAILURE, UNSPECIFIED, UNSPECIFIED WHETHER WITH HYPOXIA OR HYPERCAPNIA: ICD-10-CM

## 2021-03-31 LAB
ALBUMIN SERPL ELPH-MCNC: 3 G/DL — LOW (ref 3.3–5)
ALP SERPL-CCNC: 105 U/L — SIGNIFICANT CHANGE UP (ref 40–120)
ALT FLD-CCNC: 23 U/L — SIGNIFICANT CHANGE UP (ref 10–45)
ANION GAP SERPL CALC-SCNC: 13 MMOL/L — SIGNIFICANT CHANGE UP (ref 5–17)
APTT BLD: 106.1 SEC — HIGH (ref 27.5–35.5)
APTT BLD: 46.1 SEC — HIGH (ref 27.5–35.5)
APTT BLD: 87.3 SEC — HIGH (ref 27.5–35.5)
AST SERPL-CCNC: 21 U/L — SIGNIFICANT CHANGE UP (ref 10–40)
BILIRUB SERPL-MCNC: 0.5 MG/DL — SIGNIFICANT CHANGE UP (ref 0.2–1.2)
BUN SERPL-MCNC: 19 MG/DL — SIGNIFICANT CHANGE UP (ref 7–23)
CALCIUM SERPL-MCNC: 9 MG/DL — SIGNIFICANT CHANGE UP (ref 8.4–10.5)
CHLORIDE SERPL-SCNC: 107 MMOL/L — SIGNIFICANT CHANGE UP (ref 96–108)
CO2 SERPL-SCNC: 21 MMOL/L — LOW (ref 22–31)
CREAT SERPL-MCNC: 1.11 MG/DL — SIGNIFICANT CHANGE UP (ref 0.5–1.3)
GLUCOSE SERPL-MCNC: 149 MG/DL — HIGH (ref 70–99)
HCT VFR BLD CALC: 43.6 % — SIGNIFICANT CHANGE UP (ref 39–50)
HGB BLD-MCNC: 14 G/DL — SIGNIFICANT CHANGE UP (ref 13–17)
MAGNESIUM SERPL-MCNC: 1.9 MG/DL — SIGNIFICANT CHANGE UP (ref 1.6–2.6)
MCHC RBC-ENTMCNC: 29.1 PG — SIGNIFICANT CHANGE UP (ref 27–34)
MCHC RBC-ENTMCNC: 32.1 GM/DL — SIGNIFICANT CHANGE UP (ref 32–36)
MCV RBC AUTO: 90.6 FL — SIGNIFICANT CHANGE UP (ref 80–100)
NRBC # BLD: 0 /100 WBCS — SIGNIFICANT CHANGE UP (ref 0–0)
PHOSPHATE SERPL-MCNC: 3.4 MG/DL — SIGNIFICANT CHANGE UP (ref 2.5–4.5)
PLATELET # BLD AUTO: 166 K/UL — SIGNIFICANT CHANGE UP (ref 150–400)
POTASSIUM SERPL-MCNC: 3.6 MMOL/L — SIGNIFICANT CHANGE UP (ref 3.5–5.3)
POTASSIUM SERPL-SCNC: 3.6 MMOL/L — SIGNIFICANT CHANGE UP (ref 3.5–5.3)
PROT SERPL-MCNC: 6.9 G/DL — SIGNIFICANT CHANGE UP (ref 6–8.3)
RBC # BLD: 4.81 M/UL — SIGNIFICANT CHANGE UP (ref 4.2–5.8)
RBC # FLD: 13.7 % — SIGNIFICANT CHANGE UP (ref 10.3–14.5)
SARS-COV-2 RNA SPEC QL NAA+PROBE: SIGNIFICANT CHANGE UP
SODIUM SERPL-SCNC: 141 MMOL/L — SIGNIFICANT CHANGE UP (ref 135–145)
WBC # BLD: 15.33 K/UL — HIGH (ref 3.8–10.5)
WBC # FLD AUTO: 15.33 K/UL — HIGH (ref 3.8–10.5)

## 2021-03-31 PROCEDURE — 93010 ELECTROCARDIOGRAM REPORT: CPT

## 2021-03-31 PROCEDURE — 99223 1ST HOSP IP/OBS HIGH 75: CPT | Mod: AI,GC

## 2021-03-31 PROCEDURE — 99291 CRITICAL CARE FIRST HOUR: CPT

## 2021-03-31 PROCEDURE — 71045 X-RAY EXAM CHEST 1 VIEW: CPT | Mod: 26

## 2021-03-31 PROCEDURE — 51705 CHANGE OF BLADDER TUBE: CPT

## 2021-03-31 RX ORDER — HEPARIN SODIUM 5000 [USP'U]/ML
1000 INJECTION INTRAVENOUS; SUBCUTANEOUS
Qty: 25000 | Refills: 0 | Status: DISCONTINUED | OUTPATIENT
Start: 2021-03-31 | End: 2021-03-31

## 2021-03-31 RX ORDER — QUETIAPINE FUMARATE 200 MG/1
25 TABLET, FILM COATED ORAL AT BEDTIME
Refills: 0 | Status: DISCONTINUED | OUTPATIENT
Start: 2021-03-31 | End: 2021-04-01

## 2021-03-31 RX ORDER — PIPERACILLIN AND TAZOBACTAM 4; .5 G/20ML; G/20ML
3.38 INJECTION, POWDER, LYOPHILIZED, FOR SOLUTION INTRAVENOUS EVERY 6 HOURS
Refills: 0 | Status: DISCONTINUED | OUTPATIENT
Start: 2021-03-31 | End: 2021-03-31

## 2021-03-31 RX ORDER — MAGNESIUM SULFATE 500 MG/ML
1 VIAL (ML) INJECTION ONCE
Refills: 0 | Status: COMPLETED | OUTPATIENT
Start: 2021-03-31 | End: 2021-03-31

## 2021-03-31 RX ORDER — HALOPERIDOL DECANOATE 100 MG/ML
2 INJECTION INTRAMUSCULAR ONCE
Refills: 0 | Status: COMPLETED | OUTPATIENT
Start: 2021-03-31 | End: 2021-03-31

## 2021-03-31 RX ORDER — CEFTRIAXONE 500 MG/1
2000 INJECTION, POWDER, FOR SOLUTION INTRAMUSCULAR; INTRAVENOUS EVERY 24 HOURS
Refills: 0 | Status: DISCONTINUED | OUTPATIENT
Start: 2021-03-31 | End: 2021-04-01

## 2021-03-31 RX ORDER — METOPROLOL TARTRATE 50 MG
12.5 TABLET ORAL EVERY 12 HOURS
Refills: 0 | Status: DISCONTINUED | OUTPATIENT
Start: 2021-03-31 | End: 2021-04-01

## 2021-03-31 RX ORDER — POTASSIUM CHLORIDE 20 MEQ
40 PACKET (EA) ORAL EVERY 4 HOURS
Refills: 0 | Status: COMPLETED | OUTPATIENT
Start: 2021-03-31 | End: 2021-03-31

## 2021-03-31 RX ORDER — TAMSULOSIN HYDROCHLORIDE 0.4 MG/1
0.4 CAPSULE ORAL AT BEDTIME
Refills: 0 | Status: DISCONTINUED | OUTPATIENT
Start: 2021-03-31 | End: 2021-03-31

## 2021-03-31 RX ORDER — PANTOPRAZOLE SODIUM 20 MG/1
40 TABLET, DELAYED RELEASE ORAL
Refills: 0 | Status: DISCONTINUED | OUTPATIENT
Start: 2021-03-31 | End: 2021-04-07

## 2021-03-31 RX ORDER — ATORVASTATIN CALCIUM 80 MG/1
40 TABLET, FILM COATED ORAL AT BEDTIME
Refills: 0 | Status: DISCONTINUED | OUTPATIENT
Start: 2021-03-31 | End: 2021-04-07

## 2021-03-31 RX ORDER — IPRATROPIUM/ALBUTEROL SULFATE 18-103MCG
3 AEROSOL WITH ADAPTER (GRAM) INHALATION EVERY 6 HOURS
Refills: 0 | Status: DISCONTINUED | OUTPATIENT
Start: 2021-03-31 | End: 2021-04-07

## 2021-03-31 RX ORDER — APIXABAN 2.5 MG/1
5 TABLET, FILM COATED ORAL EVERY 12 HOURS
Refills: 0 | Status: DISCONTINUED | OUTPATIENT
Start: 2021-03-31 | End: 2021-04-07

## 2021-03-31 RX ORDER — ASPIRIN/CALCIUM CARB/MAGNESIUM 324 MG
81 TABLET ORAL DAILY
Refills: 0 | Status: DISCONTINUED | OUTPATIENT
Start: 2021-03-31 | End: 2021-04-07

## 2021-03-31 RX ORDER — CEFTRIAXONE 500 MG/1
2000 INJECTION, POWDER, FOR SOLUTION INTRAMUSCULAR; INTRAVENOUS EVERY 24 HOURS
Refills: 0 | Status: DISCONTINUED | OUTPATIENT
Start: 2021-03-31 | End: 2021-03-31

## 2021-03-31 RX ORDER — SENNA PLUS 8.6 MG/1
2 TABLET ORAL AT BEDTIME
Refills: 0 | Status: DISCONTINUED | OUTPATIENT
Start: 2021-03-31 | End: 2021-04-07

## 2021-03-31 RX ADMIN — CEFTRIAXONE 100 MILLIGRAM(S): 500 INJECTION, POWDER, FOR SOLUTION INTRAMUSCULAR; INTRAVENOUS at 19:56

## 2021-03-31 RX ADMIN — QUETIAPINE FUMARATE 25 MILLIGRAM(S): 200 TABLET, FILM COATED ORAL at 17:29

## 2021-03-31 RX ADMIN — ATORVASTATIN CALCIUM 40 MILLIGRAM(S): 80 TABLET, FILM COATED ORAL at 22:26

## 2021-03-31 RX ADMIN — Medication 650 MILLIGRAM(S): at 06:25

## 2021-03-31 RX ADMIN — Medication 40 MILLIEQUIVALENT(S): at 17:30

## 2021-03-31 RX ADMIN — Medication 3 MILLILITER(S): at 17:29

## 2021-03-31 RX ADMIN — SENNA PLUS 2 TABLET(S): 8.6 TABLET ORAL at 22:26

## 2021-03-31 RX ADMIN — APIXABAN 5 MILLIGRAM(S): 2.5 TABLET, FILM COATED ORAL at 16:51

## 2021-03-31 RX ADMIN — Medication 81 MILLIGRAM(S): at 16:52

## 2021-03-31 RX ADMIN — Medication 12.5 MILLIGRAM(S): at 16:51

## 2021-03-31 RX ADMIN — HALOPERIDOL DECANOATE 2 MILLIGRAM(S): 100 INJECTION INTRAMUSCULAR at 16:57

## 2021-03-31 RX ADMIN — HEPARIN SODIUM 10 UNIT(S)/HR: 5000 INJECTION INTRAVENOUS; SUBCUTANEOUS at 14:24

## 2021-03-31 RX ADMIN — Medication 100 GRAM(S): at 12:26

## 2021-03-31 RX ADMIN — PIPERACILLIN AND TAZOBACTAM 200 GRAM(S): 4; .5 INJECTION, POWDER, LYOPHILIZED, FOR SOLUTION INTRAVENOUS at 14:32

## 2021-03-31 RX ADMIN — Medication 40 MILLIEQUIVALENT(S): at 12:26

## 2021-03-31 NOTE — PROGRESS NOTE ADULT - PROBLEM SELECTOR PLAN 7
SIENNA vs CKD vs SIENNA on CKD. Unknown baseline.   -f/u urine lytes   -renally dose meds/ avoid nephrotoxins

## 2021-03-31 NOTE — PROGRESS NOTE ADULT - PROBLEM SELECTOR PLAN 7
SIENNA vs CKD vs SIENNA on CKD. Unknown baseline.   -f/u urine lytes   -renally dose meds/ avoid nephrotoxins <resolved> Unknown baseline.   - 3/31 Cr improved: 1.33-> 1.11   -renally dose meds/ avoid nephrotoxins

## 2021-03-31 NOTE — PROGRESS NOTE ADULT - PROBLEM SELECTOR PLAN 2
i/s/o indwelling suprapubic catheter, suprapubic tenderness, positive UA. Now afebrile after Ceftriaxone started.   - initially started on ceftriaxone 1g, increased to 2g, now dc'd in favor for zosyn given history of indwelling estarda for 5 day course  - follow urine cultures   - follow blood cultures i/s/o indwelling suprapubic catheter, suprapubic tenderness, positive UA. Now afebrile after Ceftriaxone started.   - s/p ceftriaxone 1g -> increased to 2g -> zosyn given history of indwelling estrada for 5 day course  - 3/31 PM transitioned back to ctx 2g  - follow urine cultures   - follow blood cultures

## 2021-03-31 NOTE — PROGRESS NOTE ADULT - SUBJECTIVE AND OBJECTIVE BOX
Hospital Course:  85yo M with PMH of Dementia (AAO x ?1 at baseline), HTN, ??AF (script for Xarelto in surescripts) BPH with chronic suprapubic catheter was BIBEMS with complaint of SOB and ?AMS, and was found to be in acute hypoxic respiratory failure on 3/30. Patient unable to provide any history, information obtained from the chart. Pt had onset of SOB on day of admission 3/30. In ED notably had T 101 and was put 6L NC, then weaned to 4L NC and eventually to room air shortly after arrival to [facility]. ED labs notable for WBC 11.16, Cr 1.33, , UA positive for nitrites and leukocyte esterase. CXR without any consolidations or infiltrates. EKG showing AFib with RVR. Was notably wheezing on exam. Now s/p 1 x nebs saturating 93-95% on RA, mental status at baseline of AAOx1, Afib in 90s-100s.  In ED was given CTX, azithro, solumedrol 125mg x1, and duonebs. Suprapubic cath exchanged by urology on 3/30. Plan to continue CTX 1g for 5 days total to tx UTI. AFib w/ RVR most likely in setting of sepsis 2/2 UTI. Started on lopressor 12.5mg BID and heparin gtt. Initially r/o COVID but COVID-19 PCR now neg x2. Respiratory failure deemed most likely d/t exacerbation of COPD in setting of smoking history as pt appeared to improve with duonebs. Patient to be transferred out of COVID unit to non-COVID unit for further management.    INTERVAL HPI/OVERNIGHT EVENTS: Unable to elicit subjective complaints from patient due to mental status.     VITAL SIGNS:  T(C): 36.8 (21 @ 09:26), Max: 37.3 (21 @ 13:22)  T(F): 98.2 (21 @ 09:26), Max: 99.1 (21 @ 13:22)  HR: 99 (21 @ 09:00) (93 - 118)  BP: 113/73 (21 @ 09:00) (107/66 - 151/84)  BP(mean): 86 (21 @ 06:26) (81 - 108)  RR: 26 (21 @ 09:00) (18 - 29)  SpO2: 97% (21 @ 09:00) (89% - 97%)  Wt(kg): --    PHYSICAL EXAM:    Constitutional: resting comfortably in bed; NAD  Respiratory: CTA B/L; no W/R/R, no retractions, coughing intermittently  Cardiac: +S1/S2; RRR; no M/R/G;  Gastrointestinal: soft, NT/ND; no rebound or guarding;  Genitourinary: estrada catheter in place  Extremities: WWP, no clubbing or cyanosis; no peripheral edema  Neurologic: AAOx1, oriented to name, knows he is in a hospital but is unsure of which one    MEDICATIONS  (STANDING):  cefTRIAXone   IVPB 2000 milliGRAM(s) IV Intermittent every 24 hours  heparin  Infusion.  Unit(s)/Hr (12 mL/Hr) IV Continuous <Continuous>    MEDICATIONS  (PRN):      Allergies    Allergy Status Unknown    Intolerances        LABS:                        14.0   15.33 )-----------( 166      ( 31 Mar 2021 06:50 )             43.6         141  |  107  |  19  ----------------------------<  149<H>  3.6   |  21<L>  |  1.11    Ca    9.0      31 Mar 2021 06:50  Phos  3.4       Mg     1.9         TPro  6.9  /  Alb  3.0<L>  /  TBili  0.5  /  DBili  x   /  AST  21  /  ALT  23  /  AlkPhos  105  -    PT/INR - ( 30 Mar 2021 11:20 )   PT: 12.6 sec;   INR: 1.05          PTT - ( 31 Mar 2021 06:50 )  PTT:87.3 sec  Urinalysis Basic - ( 30 Mar 2021 11:34 )    Color: Yellow / Appearance: SL CLOUDY / S.015 / pH: x  Gluc: x / Ketone: NEGATIVE  / Bili: NEGATIVE / Urobili: 0.2 E.U./dL   Blood: x / Protein: 30 mg/dL / Nitrite: POSITIVE   Leuk Esterase: Moderate / RBC: < 5 /HPF / WBC > 10 /HPF   Sq Epi: x / Non Sq Epi: x / Bacteria: Many /HPF        RADIOLOGY & ADDITIONAL TESTS: Reviewed

## 2021-03-31 NOTE — PROGRESS NOTE ADULT - PROBLEM SELECTOR PLAN 4
Respiratory failure deemed most likely d/t exacerbation of COPD in setting of smoking history as pt appeared to improve with duonebs.  -s/p duonebs  -s/p solumedrol 125mg  -saturating well on 2L NC Respiratory failure deemed most likely d/t exacerbation of COPD in setting of smoking history as pt appeared to improve with duonebs.  -s/p duonebs  -s/p solumedrol 125mg  -3/31 PM weaned to room air

## 2021-03-31 NOTE — PROGRESS NOTE ADULT - PROBLEM SELECTOR PLAN 3
RULED OUT.  Negative x2, low clinical suspicion. Respiratory failure deemed most likely d/t exacerbation of COPD in setting of smoking history as pt appeared to improve with duonebs. Per collateral, patient has no history of afib but was found to have afib w/ RVR. Placed on heparin gtt.  - dc heparin gtt and start eliquis  - metoprolol for rate control Per collateral, patient has no history of afib but was found to have afib w/ RVR likely in setting of sepsis. Placed on heparin gtt and rate control w/ metoprolol  - dc heparin gtt and start eliquis 5 BID   -  on 3/31 PM, will start metoprolol 12.5 BID

## 2021-03-31 NOTE — PROGRESS NOTE ADULT - PROBLEM SELECTOR PLAN 3
RULED OUT.  Negative x2, low clinical suspicion. Respiratory failure deemed most likely d/t exacerbation of COPD in setting of smoking history as pt appeared to improve with duonebs.

## 2021-03-31 NOTE — PROGRESS NOTE ADULT - PROBLEM SELECTOR PLAN 8
BPH w/ chronic suprapubic catheter (catheter placed 1 month ago per A)  - catheter changed overnight on 3/31  - will hold flomax given suprapubic cathether

## 2021-03-31 NOTE — CHART NOTE - NSCHARTNOTEFT_GEN_A_CORE
Spoke with a nurse involved in patient's care ([phone]). Reported that patient has history of dementia, hypertension, and an indwelling estrada catheter, however she is not aware of any history of atrial fibrillation. Patient has 24/7 aid, contact number [phone]. Outpatient medications updated.
TRANSFER NOTE: 3WO COVID TELE TO NON-COVID SERVICE    Hospital Course:  85yo M with PMH of Dementia (AAO x ?1 at baseline), HTN, ??AF (script for Xarelto in surescripts) BPH with chronic suprapubic catheter was BIBEMS with complaint of SOB and ?AMS, and was found to be in acute hypoxic respiratory failure on 3/30. Patient unable to provide any history, information obtained from the chart. Pt had onset of SOB on day of admission 3/30. In ED notably had T 101 and was put 6L NC, then weaned to 4L NC and eventually to room air shortly after arrival to [facility]. ED labs notable for WBC 11.16, Cr 1.33, , UA positive for nitrites and leukocyte esterase. CXR without any consolidations or infiltrates. EKG showing AFib with RVR. Was notably wheezing on exam. Now s/p 1 x nebs saturating 93-95% on RA, mental status at baseline of AAOx1, Afib in 90s-100s.  In ED was given CTX, azithro, solumedrol 125mg x1, and duonebs. Suprapubic cath exchanged by urology on 3/30. Plan to continue CTX 1g for 5 days total to tx UTI. AFib w/ RVR most likely in setting of sepsis 2/2 UTI. Started on lopressor 12.5mg BID and heparin gtt. Initially r/o COVID but COVID-19 PCR now neg x2. Respiratory failure deemed most likely d/t exacerbation of COPD in setting of smoking history as pt appeared to improve with duonebs. Patient to be transferred out of COVID unit to non-COVID unit for further management.    Please see H&P from earlier this shift for further details.

## 2021-03-31 NOTE — PROGRESS NOTE ADULT - PROBLEM SELECTOR PLAN 8
BPH w/ chronic suprapubic catheter (catheter placed 1 month ago per A)  - catheter changed overnight on 3/31  - will hold flomax given suprapubic cathether BPH w/ chronic suprapubic catheter (catheter placed 1 month ago per A). Takes flomax at home.  - catheter changed overnight on 3/31  - holding flomax given suprapubic catheter - per urology once patient has chronic indwelling estrada or suprapubic catheter

## 2021-03-31 NOTE — PROGRESS NOTE ADULT - ATTENDING COMMENTS
Patient discussed with resident team and plan of care reviewed. I have personally reviewed all pertinent labs and imaging and performed an independent history and physical. Resident note personally reviewed, and I agree with above resident note with the following additions:    86YOM with history of dementia (baseline AAOx1), essential HTN, chronic afib, BPH s/p suprapubic catheter admitted for acute hypoxemic respiratory failure 2/2 COPD exacerbation and severe sepsis with lactic acidosis and acute metabolic encephalopathy 2/2 UTI. Improving with abx and stepped down to RMF 3/31.    HR above goal, in 110s-120s - will add metoprolol. Weaned off O2 today with SpO2 goal 88-92%. No known prior history of afib, starting on Eliquis and will get TSH and TTE. Continue abx for UTI and follow up urine cultures. Will have PT evaluate. Patient discussed with resident team and plan of care reviewed. I have personally reviewed all pertinent labs and imaging and performed an independent history and physical. Resident note personally reviewed, and I agree with above resident note with the following additions:    86YOM with history of dementia (baseline AAOx1), essential HTN, BPH s/p suprapubic catheter admitted for acute hypoxemic respiratory failure 2/2 COPD exacerbation and severe sepsis with lactic acidosis and acute metabolic encephalopathy 2/2 UTI. Also with suspected new onset afib with RVR. Improving with abx and stepped down to RMF 3/31.    HR above goal, in 110s-120s - will add metoprolol. Weaned off O2 today with SpO2 goal 88-92%. No known prior history of afib, starting on Eliquis and will get TSH and TTE. Continue abx for UTI and follow up urine cultures. Will have PT evaluate. Patient discussed with resident team and plan of care reviewed. I have personally reviewed all pertinent labs and imaging and performed an independent history and physical. Resident note personally reviewed, and I agree with above resident note with the following additions:    86YOM with history of dementia (baseline AAOx1), essential HTN, BPH s/p suprapubic catheter admitted for acute hypoxemic respiratory failure 2/2 COPD exacerbation and severe sepsis with lactic acidosis and acute metabolic encephalopathy 2/2 UTI. Also with suspected new onset afib with RVR. Improving with abx and stepped down to RMF 3/31.    Family history, social history, past medical history, and home medications from ICU H&P were reviewed and are unchanged except as noted below.    HR above goal, in 110s-120s - will add metoprolol. Weaned off O2 today with SpO2 goal 88-92%. No known prior history of afib, starting on Eliquis and will get TSH and TTE. Continue abx for UTI and follow up urine cultures. Will have PT evaluate.

## 2021-03-31 NOTE — PROGRESS NOTE ADULT - ASSESSMENT
85yo M with PMH of Dementia (AAO x ?1 at baseline), HTN, ??AF (script for Xarelto in surescripts) BPH with chronic suprapubic catheter was BIBEMS with complaint of SOB and ?AMS, and was found to be in acute hypoxic respiratory failure with ED exam notable for wheezes, AFib with RVR and sepsis likely 2/2 UTI. Now s/p 1 x nebs saturating 93-95% on RA, mental status at baseline, with rate controlled afib.

## 2021-03-31 NOTE — PROGRESS NOTE ADULT - PROBLEM SELECTOR PLAN 1
Met 2/4 SIRS (febrile to 38.2 in ED + tachycardia to 112+ lactate of 2.6 would make it severe sepsis. qSOFA 1 (?AMS). Sepsis likely 2/2 UTI as below. S/P fluid resuscitation with 2.1L.   -initially started on ceftriaxone 1g, increased to 2g, now dc'd in favor for zosyn given history of indwelling estrada Met 2/4 SIRS (febrile to 38.2 in ED + tachycardia to 112+ lactate of 2.6 would make it severe sepsis. Sepsis likely 2/2 UTI as below. S/P fluid resuscitation with 2.1L.   -initially started on ceftriaxone 1g, increased to 2g, now dc'd in favor for zosyn given history of indwelling estrada

## 2021-03-31 NOTE — PROGRESS NOTE ADULT - PROBLEM SELECTOR PLAN 6
Norvasc 10  - now normotensive, resume as tolerated    #atrial fibrillation  Afib w/ RVR most likely 2/2 acute infection. ?New onset vs known Afib given script for Xarelto found in Superscripts  Now rate controlled with HR in 90s-100s. Treatment of infection will help with rate.  - Hep gtt, f/u PTT in am, consider switching to NOACS  - s/p 1 dose of lopressor, will hold for now given sepsis  - f/u formal med rec Norvasc 10  - now normotensive, resume as tolerated

## 2021-03-31 NOTE — PROGRESS NOTE ADULT - ATTENDING COMMENTS
COVID neg with likely urosepsis. CXR read noted regarding loop of bowel noted between liver and diaphragm. Continue Zosyn and f/u Cx.

## 2021-03-31 NOTE — PROGRESS NOTE ADULT - PROBLEM SELECTOR PLAN 1
Met 2/4 SIRS (febrile to 38.2 in ED + tachycardia to 112+ lactate of 2.6 would make it severe sepsis. Sepsis likely 2/2 UTI as below. S/P fluid resuscitation with 2.1L.   -initially started on ceftriaxone 1g, increased to 2g, now dc'd in favor for zosyn given history of indwelling estrada

## 2021-03-31 NOTE — PROGRESS NOTE ADULT - PROBLEM SELECTOR PLAN 9
F - s/p 2.1 L   E - replete K >4, Mag >3, Phos > 2   N - DASH diet   A - Heparin drip for AF F - s/p 2.1 L   E - replete K >4, Mag >3, Phos > 2   N - DASH diet   A - Eliquis

## 2021-03-31 NOTE — PROGRESS NOTE ADULT - PROBLEM SELECTOR PLAN 5
Patient alert and oriented 1-2, currently at his baseline Patient alert and oriented 1-2, currently at his baseline.  - has 24/7 aid, contact number [phone]  - nurse involved in patient's care ([phone])  - PT consult

## 2021-03-31 NOTE — PROGRESS NOTE ADULT - PROBLEM SELECTOR PLAN 6
Norvasc 10  - now normotensive, resume as tolerated    #atrial fibrillation  Afib w/ RVR most likely 2/2 acute infection. ?New onset vs known Afib given script for Xarelto found in Superscripts  Now rate controlled with HR in 90s-100s. Treatment of infection will help with rate.  - Hep gtt, f/u PTT in am, tomorrow likely switch to NOACS  - Lopressor 12.5 BID for now Norvasc 10  - now normotensive, resume as tolerated    #atrial fibrillation  Afib w/ RVR most likely 2/2 acute infection. ?New onset vs known Afib given script for Xarelto found in Superscripts  Now rate controlled with HR in 90s-100s. Treatment of infection will help with rate.  - Hep gtt, f/u PTT in am, consider switching to NOACS  - s/p 1 dose of lopressor, will hold for now given sepsis  - f/u formal med rec

## 2021-03-31 NOTE — PROGRESS NOTE ADULT - PROBLEM SELECTOR PLAN 4
Respiratory failure deemed most likely d/t exacerbation of COPD in setting of smoking history as pt appeared to improve with duonebs.  -s/p duonebs  -s/p solumedrol 125mg  -saturating well on 2L NC

## 2021-03-31 NOTE — PROGRESS NOTE ADULT - SUBJECTIVE AND OBJECTIVE BOX
*** INCOMPLETE ***    OVERNIGHT EVENTS: As per overnight staff, there were no acute events overnight    INTERVAL EVENTS:    SUBJECTIVE HPI: Patient seen and examined at bedside. Patient resting comfortably, no complaints at this time. Patient denies: fever, chills, weakness, dizziness, headaches, changes in vision, chest pain, palpitations, shortness of breath, cough, N/V, diarrhea or constipation, dysuria, LE edema. ROS otherwise negative.      VITAL SIGNS:  Vital Signs Last 24 Hrs  T(C): 37.3 (31 Mar 2021 10:00), Max: 37.3 (31 Mar 2021 10:00)  T(F): 99.2 (31 Mar 2021 10:00), Max: 99.2 (31 Mar 2021 10:00)  HR: 110 (31 Mar 2021 12:34) (93 - 111)  BP: 109/78 (31 Mar 2021 12:34) (107/66 - 151/84)  BP(mean): 86 (31 Mar 2021 06:26) (81 - 108)  RR: 20 (31 Mar 2021 12:34) (18 - 29)  SpO2: 96% (31 Mar 2021 12:34) (89% - 97%)      21 @ 07:  -  21 @ 07:00  --------------------------------------------------------  IN: 0 mL / OUT: 700 mL / NET: -700 mL    21 @ 07:01  21 @ 13:56  --------------------------------------------------------  IN: 0 mL / OUT: 800 mL / NET: -800 mL        PHYSICAL EXAM:  General: Comfortable, pleasant/anxious/agitated, Ill-appearing, well-nourished/frail/cachectic, comfortable / in distress  Neurological: AAOx3, no focal deficits  HEENT: NC/AT; EOMI, PERRL, clear conjunctiva, no nasal or oropharyngeal discharge or exudates, MMM  Neck: supple, no cervical or post-auricular lymphadenopathy  Cardiovascular: +S1/S2, no murmurs/rubs/gallops, RRR  Respiratory: CTA B/L, no diminished breath sounds, no wheezes/rales/rhonchi, no increased work of breathing or accessory muscle use  Gastrointestinal: soft, NT/ND; active BSx4 quadrants  Genitourinary: no suprapubic tenderness, no CVA tenderness  Extremities: WWP; no edema, clubbing or cyanosis  Vascular: 2+ radial, DP/PT pulses B/L  Skin: no rashes  Lines/Drains:       MEDICATIONS:  MEDICATIONS  (STANDING):  heparin  Infusion 1000 Unit(s)/Hr (10 mL/Hr) IV Continuous <Continuous>  piperacillin/tazobactam IVPB.. 3.375 Gram(s) IV Intermittent every 6 hours  potassium chloride    Tablet ER 40 milliEquivalent(s) Oral every 4 hours    MEDICATIONS  (PRN):      ALLERGIES/INTOLERANCES:  Allergies    Allergy Status Unknown    Intolerances        LABS:                        14.0   15.33 )-----------( 166      ( 31 Mar 2021 06:50 )             43.6     03-31    141  |  107  |  19  ----------------------------<  149<H>  3.6   |  21<L>  |  1.11    Ca    9.0      31 Mar 2021 06:50  Phos  3.4     03-31  Mg     1.9     -31    TPro  6.9  /  Alb  3.0<L>  /  TBili  0.5  /  DBili  x   /  AST  21  /  ALT  23  /  AlkPhos  105  03-31    PT/INR - ( 30 Mar 2021 11:20 )   PT: 12.6 sec;   INR: 1.05          PTT - ( 31 Mar 2021 11:54 )  PTT:106.1 sec  CAPILLARY BLOOD GLUCOSE      POCT Blood Glucose.: 165 mg/dL (30 Mar 2021 10:52)      CARDIAC MARKERS ( 30 Mar 2021 11:20 )  <0.017 ng/mL / x     / x     / x     / x            Urinalysis Basic - ( 30 Mar 2021 11:34 )    Color: Yellow / Appearance: SL CLOUDY / S.015 / pH: x  Gluc: x / Ketone: NEGATIVE  / Bili: NEGATIVE / Urobili: 0.2 E.U./dL   Blood: x / Protein: 30 mg/dL / Nitrite: POSITIVE   Leuk Esterase: Moderate / RBC: < 5 /HPF / WBC > 10 /HPF   Sq Epi: x / Non Sq Epi: x / Bacteria: Many /HPF            Microbiology:      RADIOLOGY, EKG AND ADDITIONAL TESTS: Reviewed. *** TRANSFER: 3 Woll to [facility]***    Hospital Course   87yo M with PMH of Dementia (AAO x ?1 at baseline), HTN, BPH with chronic suprapubic catheter was BIBEMS with complaint of SOB and ?AMS, and was found to be in acute hypoxic respiratory failure on 3/30 with new onset afib. Patient unable to provide any history, information obtained from the chart. Pt had onset of SOB on day of admission 3/30. In ED notably had T 101 and was put 6L NC, then weaned to 4L NC and eventually to room air shortly after arrival to [facility]. ED labs notable for WBC 11.16, Cr 1.33, , UA positive for nitrites and leukocyte esterase. CXR without any consolidations or infiltrates. EKG showing AFib with RVR. Was notably wheezing on exam. Now s/p 1 x nebs saturating 93-95% on RA, mental status at baseline of AAOx1, Afib in 90s-100s.  In ED was given CTX, azithro, solumedrol 125mg x1, and duonebs. Suprapubic cath exchanged by urology on 3/30. Plan to continue CTX 1g for 5 days total to tx UTI. AFib w/ RVR most likely in setting of sepsis 2/2 UTI. Started on lopressor 12.5mg BID and heparin gtt. Initially r/o COVID but COVID-19 PCR now neg x2. Respiratory failure deemed most likely d/t exacerbation of COPD in setting of smoking history as pt appeared to improve with duonebs. Patient to be transferred out of COVID unit to non-COVID unit for further management.    INTERVAL EVENTS:    SUBJECTIVE HPI: Patient seen and examined at bedside. Patient resting comfortably, no complaints at this time. Patient denies: fever, chills, weakness, dizziness, headaches, changes in vision, chest pain, palpitations, shortness of breath, cough, N/V, diarrhea or constipation, dysuria, LE edema. ROS otherwise negative.      VITAL SIGNS:  Vital Signs Last 24 Hrs  T(C): 37.3 (31 Mar 2021 10:00), Max: 37.3 (31 Mar 2021 10:00)  T(F): 99.2 (31 Mar 2021 10:00), Max: 99.2 (31 Mar 2021 10:00)  HR: 110 (31 Mar 2021 12:34) (93 - 111)  BP: 109/78 (31 Mar 2021 12:34) (107/66 - 151/84)  BP(mean): 86 (31 Mar 2021 06:26) (81 - 108)  RR: 20 (31 Mar 2021 12:34) (18 - 29)  SpO2: 96% (31 Mar 2021 12:34) (89% - 97%)      21 @ 07:01  -  21 @ 07:00  --------------------------------------------------------  IN: 0 mL / OUT: 700 mL / NET: -700 mL    21 @ 07:01  -  21 @ 13:56  --------------------------------------------------------  IN: 0 mL / OUT: 800 mL / NET: -800 mL        PHYSICAL EXAM:  General: Comfortable, pleasant/anxious/agitated, Ill-appearing, well-nourished/frail/cachectic, comfortable / in distress  Neurological: AAOx3, no focal deficits  HEENT: NC/AT; EOMI, PERRL, clear conjunctiva, no nasal or oropharyngeal discharge or exudates, MMM  Neck: supple, no cervical or post-auricular lymphadenopathy  Cardiovascular: +S1/S2, no murmurs/rubs/gallops, RRR  Respiratory: CTA B/L, no diminished breath sounds, no wheezes/rales/rhonchi, no increased work of breathing or accessory muscle use  Gastrointestinal: soft, NT/ND; active BSx4 quadrants  Genitourinary: no suprapubic tenderness, no CVA tenderness  Extremities: WWP; no edema, clubbing or cyanosis  Vascular: 2+ radial, DP/PT pulses B/L  Skin: no rashes  Lines/Drains:       MEDICATIONS:  MEDICATIONS  (STANDING):  heparin  Infusion 1000 Unit(s)/Hr (10 mL/Hr) IV Continuous <Continuous>  piperacillin/tazobactam IVPB.. 3.375 Gram(s) IV Intermittent every 6 hours  potassium chloride    Tablet ER 40 milliEquivalent(s) Oral every 4 hours    MEDICATIONS  (PRN):      ALLERGIES/INTOLERANCES:  Allergies    Allergy Status Unknown    Intolerances        LABS:                        14.0   15.33 )-----------( 166      ( 31 Mar 2021 06:50 )             43.6     03-    141  |  107  |  19  ----------------------------<  149<H>  3.6   |  21<L>  |  1.11    Ca    9.0      31 Mar 2021 06:50  Phos  3.4     03-31  Mg     1.9     -    TPro  6.9  /  Alb  3.0<L>  /  TBili  0.5  /  DBili  x   /  AST  21  /  ALT  23  /  AlkPhos  105  03-31    PT/INR - ( 30 Mar 2021 11:20 )   PT: 12.6 sec;   INR: 1.05          PTT - ( 31 Mar 2021 11:54 )  PTT:106.1 sec  CAPILLARY BLOOD GLUCOSE      POCT Blood Glucose.: 165 mg/dL (30 Mar 2021 10:52)      CARDIAC MARKERS ( 30 Mar 2021 11:20 )  <0.017 ng/mL / x     / x     / x     / x            Urinalysis Basic - ( 30 Mar 2021 11:34 )    Color: Yellow / Appearance: SL CLOUDY / S.015 / pH: x  Gluc: x / Ketone: NEGATIVE  / Bili: NEGATIVE / Urobili: 0.2 E.U./dL   Blood: x / Protein: 30 mg/dL / Nitrite: POSITIVE   Leuk Esterase: Moderate / RBC: < 5 /HPF / WBC > 10 /HPF   Sq Epi: x / Non Sq Epi: x / Bacteria: Many /HPF            Microbiology:      RADIOLOGY, EKG AND ADDITIONAL TESTS: Reviewed. *** TRANSFER: 3 Woll to [facility]***    Hospital Course   85yo M with PMH of Dementia (AAO x ?1 at baseline), HTN, BPH with chronic suprapubic catheter was BIBEMS with complaint of SOB and ?AMS, and was found to be in acute hypoxic respiratory failure on 3/30 with new onset afib. Patient unable to provide any history, information obtained from the chart. Pt had onset of SOB on day of admission 3/30. In ED notably had T 101 and was put 6L NC, then weaned to 4L NC and eventually to room air shortly after arrival to [facility]. ED labs notable for WBC 11.16, Cr 1.33, , UA positive for nitrites and leukocyte esterase. CXR without any consolidations or infiltrates. EKG showing AFib with RVR. Was notably wheezing on exam. Now s/p 1 x nebs saturating 93-95% on RA, mental status at baseline of AAOx1, Afib in 90s-100s.  In ED was given CTX, azithro, solumedrol 125mg x1, and duonebs. Suprapubic cath exchanged by urology on 3/30. Plan to continue CTX 1g for 5 days total to tx UTI. AFib w/ RVR most likely in setting of sepsis 2/2 UTI. Started on lopressor 12.5mg BID and heparin gtt. Initially r/o COVID but COVID-19 PCR now neg x2. Respiratory failure deemed most likely d/t exacerbation of COPD in setting of smoking history as pt appeared to improve with duonebs. Patient to be transferred out of COVID unit to non-COVID unit for further management.    INTERVAL EVENTS:    SUBJECTIVE HPI: Patient seen and examined at bedside. Patient resting comfortably, no complaints at this time. Patient denies: fever, chills, weakness, headaches, chest pain, palpitations, shortness of breath. ROS otherwise negative.      VITAL SIGNS:  Vital Signs Last 24 Hrs  T(C): 37.3 (31 Mar 2021 10:00), Max: 37.3 (31 Mar 2021 10:00)  T(F): 99.2 (31 Mar 2021 10:00), Max: 99.2 (31 Mar 2021 10:00)  HR: 110 (31 Mar 2021 12:34) (93 - 111)  BP: 109/78 (31 Mar 2021 12:34) (107/66 - 151/84)  BP(mean): 86 (31 Mar 2021 06:26) (81 - 108)  RR: 20 (31 Mar 2021 12:34) (18 - 29)  SpO2: 96% (31 Mar 2021 12:34) (89% - 97%)      21 @ 07:01  -  21 @ 07:00  --------------------------------------------------------  IN: 0 mL / OUT: 700 mL / NET: -700 mL    21 @ 07:01  -  21 @ 13:56  --------------------------------------------------------  IN: 0 mL / OUT: 800 mL / NET: -800 mL        PHYSICAL EXAM:  General: Comfortable, pleasant, well-nourished in NAD, frequent coughing  Neurological: AAOx1 (self only, thinks he is at [facility])  HEENT: NC/AT; clear conjunctiva, no nasal or oropharyngeal discharge or exudates, MMM  Neck: supple, no cervical or post-auricular lymphadenopathy  Cardiovascular: +S1/S2, no murmurs/rubs/gallops, RRR  Respiratory: CTA B/L, no diminished breath sounds, no wheezes/rales/rhonchi, no increased work of breathing or accessory muscle use  Gastrointestinal: soft, NT/ND; active BSx4 quadrants  Genitourinary: suprapubic catheter in place, no CVA tenderness  Extremities: WWP; no edema, clubbing or cyanosis  Vascular: 2+ radial, DP/PT pulses B/L  Skin: no rashes  Lines/Drains:     MEDICATIONS:  MEDICATIONS  (STANDING):  heparin  Infusion 1000 Unit(s)/Hr (10 mL/Hr) IV Continuous <Continuous>  piperacillin/tazobactam IVPB.. 3.375 Gram(s) IV Intermittent every 6 hours  potassium chloride    Tablet ER 40 milliEquivalent(s) Oral every 4 hours    MEDICATIONS  (PRN):      ALLERGIES/INTOLERANCES:  Allergies    Allergy Status Unknown    Intolerances        LABS:                        14.0   15.33 )-----------( 166      ( 31 Mar 2021 06:50 )             43.6     -31    141  |  107  |  19  ----------------------------<  149<H>  3.6   |  21<L>  |  1.11    Ca    9.0      31 Mar 2021 06:50  Phos  3.4     03-31  Mg     1.9     -    TPro  6.9  /  Alb  3.0<L>  /  TBili  0.5  /  DBili  x   /  AST  21  /  ALT  23  /  AlkPhos  105  -    PT/INR - ( 30 Mar 2021 11:20 )   PT: 12.6 sec;   INR: 1.05          PTT - ( 31 Mar 2021 11:54 )  PTT:106.1 sec  CAPILLARY BLOOD GLUCOSE      POCT Blood Glucose.: 165 mg/dL (30 Mar 2021 10:52)      CARDIAC MARKERS ( 30 Mar 2021 11:20 )  <0.017 ng/mL / x     / x     / x     / x            Urinalysis Basic - ( 30 Mar 2021 11:34 )    Color: Yellow / Appearance: SL CLOUDY / S.015 / pH: x  Gluc: x / Ketone: NEGATIVE  / Bili: NEGATIVE / Urobili: 0.2 E.U./dL   Blood: x / Protein: 30 mg/dL / Nitrite: POSITIVE   Leuk Esterase: Moderate / RBC: < 5 /HPF / WBC > 10 /HPF   Sq Epi: x / Non Sq Epi: x / Bacteria: Many /HPF            Microbiology:      RADIOLOGY, EKG AND ADDITIONAL TESTS: Reviewed.

## 2021-03-31 NOTE — PROGRESS NOTE ADULT - PROBLEM SELECTOR PLAN 2
i/s/o indwelling suprapubic catheter, suprapubic tenderness, positive UA. Now afebrile after Ceftriaxone started.   - initially started on ceftriaxone 1g, increased to 2g, now dc'd in favor for zosyn given history of indwelling estrada for 5 day course  - follow urine cultures   - follow blood cultures

## 2021-03-31 NOTE — PROGRESS NOTE ADULT - ASSESSMENT
87yo M with PMH of Dementia (AAO x ?1 at baseline), HTN, ??AF (script for Xarelto in surescripts) BPH with chronic suprapubic catheter was BIBEMS with complaint of SOB and ?AMS, and was found to be in acute hypoxic respiratory failure with ED exam notable for wheezes, AFib with RVR and sepsis likely 2/2 UTI. Now s/p 1 x nebs saturating 93-95% on RA, mental status at baseline, with rate controlled afib.  87yo M with PMH of Dementia (AAO x ?1 at baseline), HTN, BPH with chronic suprapubic catheter was BIBEMS with complaint of SOB and ?AMS, and was found to be in acute hypoxic respiratory failure with ED exam notable for wheezes, AFib with RVR and sepsis likely 2/2 UTI.

## 2021-04-01 LAB
-  AMIKACIN: SIGNIFICANT CHANGE UP
-  AZTREONAM: SIGNIFICANT CHANGE UP
-  CEFEPIME: SIGNIFICANT CHANGE UP
-  CEFTAZIDIME: SIGNIFICANT CHANGE UP
-  CIPROFLOXACIN: SIGNIFICANT CHANGE UP
-  GENTAMICIN: SIGNIFICANT CHANGE UP
-  IMIPENEM: SIGNIFICANT CHANGE UP
-  LEVOFLOXACIN: SIGNIFICANT CHANGE UP
-  MEROPENEM: SIGNIFICANT CHANGE UP
-  PIPERACILLIN/TAZOBACTAM: SIGNIFICANT CHANGE UP
-  TOBRAMYCIN: SIGNIFICANT CHANGE UP
CULTURE RESULTS: SIGNIFICANT CHANGE UP
METHOD TYPE: SIGNIFICANT CHANGE UP
ORGANISM # SPEC MICROSCOPIC CNT: SIGNIFICANT CHANGE UP
ORGANISM # SPEC MICROSCOPIC CNT: SIGNIFICANT CHANGE UP
SPECIMEN SOURCE: SIGNIFICANT CHANGE UP
TSH SERPL-MCNC: 2.88 UIU/ML — SIGNIFICANT CHANGE UP (ref 0.35–4.94)

## 2021-04-01 PROCEDURE — 93306 TTE W/DOPPLER COMPLETE: CPT | Mod: 26

## 2021-04-01 RX ORDER — METOPROLOL TARTRATE 50 MG
25 TABLET ORAL EVERY 12 HOURS
Refills: 0 | Status: DISCONTINUED | OUTPATIENT
Start: 2021-04-01 | End: 2021-04-02

## 2021-04-01 RX ORDER — APIXABAN 2.5 MG/1
1 TABLET, FILM COATED ORAL
Qty: 60 | Refills: 0
Start: 2021-04-01 | End: 2021-04-30

## 2021-04-01 RX ORDER — PIPERACILLIN AND TAZOBACTAM 4; .5 G/20ML; G/20ML
4.5 INJECTION, POWDER, LYOPHILIZED, FOR SOLUTION INTRAVENOUS EVERY 6 HOURS
Refills: 0 | Status: DISCONTINUED | OUTPATIENT
Start: 2021-04-01 | End: 2021-04-07

## 2021-04-01 RX ADMIN — Medication 3 MILLILITER(S): at 17:08

## 2021-04-01 RX ADMIN — Medication 3 MILLILITER(S): at 12:10

## 2021-04-01 RX ADMIN — APIXABAN 5 MILLIGRAM(S): 2.5 TABLET, FILM COATED ORAL at 06:12

## 2021-04-01 RX ADMIN — PANTOPRAZOLE SODIUM 40 MILLIGRAM(S): 20 TABLET, DELAYED RELEASE ORAL at 06:15

## 2021-04-01 RX ADMIN — APIXABAN 5 MILLIGRAM(S): 2.5 TABLET, FILM COATED ORAL at 17:08

## 2021-04-01 RX ADMIN — PIPERACILLIN AND TAZOBACTAM 200 GRAM(S): 4; .5 INJECTION, POWDER, LYOPHILIZED, FOR SOLUTION INTRAVENOUS at 15:16

## 2021-04-01 RX ADMIN — Medication 3 MILLILITER(S): at 06:12

## 2021-04-01 RX ADMIN — PIPERACILLIN AND TAZOBACTAM 200 GRAM(S): 4; .5 INJECTION, POWDER, LYOPHILIZED, FOR SOLUTION INTRAVENOUS at 22:14

## 2021-04-01 RX ADMIN — SENNA PLUS 2 TABLET(S): 8.6 TABLET ORAL at 22:14

## 2021-04-01 RX ADMIN — PIPERACILLIN AND TAZOBACTAM 200 GRAM(S): 4; .5 INJECTION, POWDER, LYOPHILIZED, FOR SOLUTION INTRAVENOUS at 09:42

## 2021-04-01 RX ADMIN — QUETIAPINE FUMARATE 25 MILLIGRAM(S): 200 TABLET, FILM COATED ORAL at 06:12

## 2021-04-01 RX ADMIN — ATORVASTATIN CALCIUM 40 MILLIGRAM(S): 80 TABLET, FILM COATED ORAL at 22:14

## 2021-04-01 RX ADMIN — Medication 25 MILLIGRAM(S): at 17:08

## 2021-04-01 RX ADMIN — Medication 12.5 MILLIGRAM(S): at 06:12

## 2021-04-01 RX ADMIN — Medication 81 MILLIGRAM(S): at 12:10

## 2021-04-01 NOTE — PROGRESS NOTE ADULT - PROBLEM SELECTOR PLAN 5
Patient alert and oriented 1-2, currently at his baseline.  - has 24/7 aid, contact number [phone]  - nurse involved in patient's care ([phone])  - PT consult

## 2021-04-01 NOTE — PROGRESS NOTE ADULT - PROBLEM SELECTOR PLAN 4
Respiratory failure deemed most likely d/t exacerbation of COPD in setting of smoking history as pt appeared to improve with duonebs.  -s/p duonebs  -s/p solumedrol 125mg  -3/31 PM weaned to room air

## 2021-04-01 NOTE — PROGRESS NOTE ADULT - ASSESSMENT
87yo M with PMH of Dementia (AAO x ?1 at baseline), HTN, BPH with chronic suprapubic catheter was BIBEMS with complaint of SOB and ?AMS, and was found to be in acute hypoxic respiratory failure with ED exam notable for wheezes, AFib with RVR and sepsis likely 2/2 UTI.

## 2021-04-01 NOTE — PROGRESS NOTE ADULT - PROBLEM SELECTOR PLAN 8
BPH w/ chronic suprapubic catheter (catheter placed 1 month ago per A). Takes flomax at home.  - catheter changed overnight on 3/31  - holding flomax given suprapubic catheter - per urology once patient has chronic indwelling estrada or suprapubic catheter

## 2021-04-01 NOTE — PROGRESS NOTE ADULT - PROBLEM SELECTOR PLAN 1
Met 2/4 SIRS (febrile to 38.2 in ED + tachycardia to 112+ lactate of 2.6 would make it severe sepsis. Sepsis likely 2/2 UTI as below. S/P fluid resuscitation with 2.1L.   -initially started on ceftriaxone 1g, increased to 2g, then zosyn given history of indwelling estrada

## 2021-04-01 NOTE — PROGRESS NOTE ADULT - PROBLEM SELECTOR PLAN 7
<resolved> Unknown baseline.   - 4/1 Cr improved: 1.33-> 1.11 -> 1.02   -renally dose meds/ avoid nephrotoxins

## 2021-04-01 NOTE — PROGRESS NOTE ADULT - ATTENDING COMMENTS
Patient seen with resident team.  Increasing Metop today.  Delirium precautions.  Plan to f/u urine culture sensitivities.

## 2021-04-01 NOTE — PROGRESS NOTE ADULT - PROBLEM SELECTOR PLAN 3
Per collateral, patient has no history of afib but was found to have afib w/ RVR likely in setting of sepsis. Placed on heparin gtt and rate control w/ metoprolol  - c/w eliquis 5 BID   - 4/1 increased metoprolol 25 BID

## 2021-04-01 NOTE — PROGRESS NOTE ADULT - SUBJECTIVE AND OBJECTIVE BOX
OVERNIGHT EVENTS: As per overnight staff, there were no acute events overnight    INTERVAL EVENTS:    SUBJECTIVE HPI: Patient seen and examined at bedside. Patient resting comfortably, no complaints at this time. Patient denies: headaches, chest pain, palpitations, shortness of breath. ROS otherwise negative.      VITAL SIGNS:  Vital Signs Last 24 Hrs  T(C): 36.6 (01 Apr 2021 16:08), Max: 36.9 (01 Apr 2021 08:44)  T(F): 97.9 (01 Apr 2021 16:08), Max: 98.4 (01 Apr 2021 08:44)  HR: 98 (01 Apr 2021 16:08) (95 - 104)  BP: 116/80 (01 Apr 2021 16:08) (116/80 - 130/83)  BP(mean): --  RR: 18 (01 Apr 2021 16:08) (18 - 19)  SpO2: 92% (01 Apr 2021 16:08) (92% - 95%)      03-31-21 @ 07:01  -  04-01-21 @ 07:00  --------------------------------------------------------  IN: 70 mL / OUT: 2950 mL / NET: -2880 mL        PHYSICAL EXAM:  General: Comfortable, pleasant, well-nourished in NAD, frequent coughing  Neurological: AAOx1  HEENT: NC/AT; clear conjunctiva, no nasal or oropharyngeal discharge or exudates, MMM  Neck: supple, no cervical or post-auricular lymphadenopathy  Cardiovascular: +S1/S2, no murmurs/rubs/gallops, RRR  Respiratory: CTA B/L, no diminished breath sounds, no wheezes/rales/rhonchi, no increased work of breathing or accessory muscle use  Gastrointestinal: soft, NT/ND; active BSx4 quadrants  Genitourinary: suprapubic catheter in place, no CVA tenderness  Extremities: WWP; no edema, clubbing or cyanosis  Vascular: 2+ radial, DP/PT pulses B/L  Skin: no rashes  Lines/Drains:       MEDICATIONS:  MEDICATIONS  (STANDING):  albuterol/ipratropium for Nebulization 3 milliLiter(s) Nebulizer every 6 hours  apixaban 5 milliGRAM(s) Oral every 12 hours  aspirin enteric coated 81 milliGRAM(s) Oral daily  atorvastatin 40 milliGRAM(s) Oral at bedtime  metoprolol tartrate 25 milliGRAM(s) Oral every 12 hours  pantoprazole    Tablet 40 milliGRAM(s) Oral before breakfast  piperacillin/tazobactam IVPB.. 4.5 Gram(s) IV Intermittent every 6 hours  senna 2 Tablet(s) Oral at bedtime    MEDICATIONS  (PRN):      ALLERGIES/INTOLERANCES:  Allergies    Allergy Status Unknown    Intolerances        LABS:                        15.2   10.24 )-----------( 169      ( 01 Apr 2021 09:16 )             47.6     04-01    144  |  109<H>  |  20  ----------------------------<  113<H>  4.4   |  24  |  1.02    Ca    9.2      01 Apr 2021 09:16  Phos  3.0     04-01  Mg     2.2     04-01    TPro  6.7  /  Alb  2.8<L>  /  TBili  0.4  /  DBili  x   /  AST  38  /  ALT  33  /  AlkPhos  107  04-01    PTT - ( 31 Mar 2021 18:38 )  PTT:46.1 sec  CAPILLARY BLOOD GLUCOSE                        Microbiology:    Culture - Urine (collected 30 Mar 2021 18:29)  Source: .Urine Clean Catch (Midstream)  Preliminary Report (31 Mar 2021 23:16):    >100,000 CFU/ml Pseudomonas aeruginosa    Culture - Blood (collected 30 Mar 2021 18:22)  Source: .Blood Blood-Peripheral  Preliminary Report (31 Mar 2021 19:01):    No growth to date.    Culture - Blood (collected 30 Mar 2021 18:22)  Source: .Blood Blood-Peripheral  Preliminary Report (31 Mar 2021 19:01):    No growth to date.        RADIOLOGY, EKG AND ADDITIONAL TESTS: Reviewed.

## 2021-04-01 NOTE — PROGRESS NOTE ADULT - PROBLEM SELECTOR PLAN 2
i/s/o indwelling suprapubic catheter, suprapubic tenderness, positive UA. Now afebrile after Ceftriaxone started.   - s/p ceftriaxone 1g -> increased to 2g -> zosyn given history of indwelling estrada for 5 day course  - 3/31 PM transitioned back to ctx 2g  - follow urine cultures   - follow blood cultures

## 2021-04-02 LAB
ANION GAP SERPL CALC-SCNC: 11 MMOL/L — SIGNIFICANT CHANGE UP (ref 5–17)
BASOPHILS # BLD AUTO: 0.03 K/UL — SIGNIFICANT CHANGE UP (ref 0–0.2)
BASOPHILS NFR BLD AUTO: 0.3 % — SIGNIFICANT CHANGE UP (ref 0–2)
BUN SERPL-MCNC: 23 MG/DL — SIGNIFICANT CHANGE UP (ref 7–23)
CALCIUM SERPL-MCNC: 9.3 MG/DL — SIGNIFICANT CHANGE UP (ref 8.4–10.5)
CHLORIDE SERPL-SCNC: 107 MMOL/L — SIGNIFICANT CHANGE UP (ref 96–108)
CO2 SERPL-SCNC: 27 MMOL/L — SIGNIFICANT CHANGE UP (ref 22–31)
CREAT SERPL-MCNC: 1.51 MG/DL — HIGH (ref 0.5–1.3)
EOSINOPHIL # BLD AUTO: 0.04 K/UL — SIGNIFICANT CHANGE UP (ref 0–0.5)
EOSINOPHIL NFR BLD AUTO: 0.4 % — SIGNIFICANT CHANGE UP (ref 0–6)
GLUCOSE SERPL-MCNC: 133 MG/DL — HIGH (ref 70–99)
HCT VFR BLD CALC: 50.9 % — HIGH (ref 39–50)
HGB BLD-MCNC: 16.3 G/DL — SIGNIFICANT CHANGE UP (ref 13–17)
IMM GRANULOCYTES NFR BLD AUTO: 0.6 % — SIGNIFICANT CHANGE UP (ref 0–1.5)
LYMPHOCYTES # BLD AUTO: 1.88 K/UL — SIGNIFICANT CHANGE UP (ref 1–3.3)
LYMPHOCYTES # BLD AUTO: 20.3 % — SIGNIFICANT CHANGE UP (ref 13–44)
MAGNESIUM SERPL-MCNC: 2.1 MG/DL — SIGNIFICANT CHANGE UP (ref 1.6–2.6)
MCHC RBC-ENTMCNC: 28.9 PG — SIGNIFICANT CHANGE UP (ref 27–34)
MCHC RBC-ENTMCNC: 32 GM/DL — SIGNIFICANT CHANGE UP (ref 32–36)
MCV RBC AUTO: 90.2 FL — SIGNIFICANT CHANGE UP (ref 80–100)
MONOCYTES # BLD AUTO: 0.47 K/UL — SIGNIFICANT CHANGE UP (ref 0–0.9)
MONOCYTES NFR BLD AUTO: 5.1 % — SIGNIFICANT CHANGE UP (ref 2–14)
NEUTROPHILS # BLD AUTO: 6.77 K/UL — SIGNIFICANT CHANGE UP (ref 1.8–7.4)
NEUTROPHILS NFR BLD AUTO: 73.3 % — SIGNIFICANT CHANGE UP (ref 43–77)
NRBC # BLD: 0 /100 WBCS — SIGNIFICANT CHANGE UP (ref 0–0)
PHOSPHATE SERPL-MCNC: 4.4 MG/DL — SIGNIFICANT CHANGE UP (ref 2.5–4.5)
PLATELET # BLD AUTO: 227 K/UL — SIGNIFICANT CHANGE UP (ref 150–400)
POTASSIUM SERPL-MCNC: 4.2 MMOL/L — SIGNIFICANT CHANGE UP (ref 3.5–5.3)
POTASSIUM SERPL-SCNC: 4.2 MMOL/L — SIGNIFICANT CHANGE UP (ref 3.5–5.3)
RBC # BLD: 5.64 M/UL — SIGNIFICANT CHANGE UP (ref 4.2–5.8)
RBC # FLD: 14.4 % — SIGNIFICANT CHANGE UP (ref 10.3–14.5)
SODIUM SERPL-SCNC: 145 MMOL/L — SIGNIFICANT CHANGE UP (ref 135–145)
WBC # BLD: 9.25 K/UL — SIGNIFICANT CHANGE UP (ref 3.8–10.5)
WBC # FLD AUTO: 9.25 K/UL — SIGNIFICANT CHANGE UP (ref 3.8–10.5)

## 2021-04-02 PROCEDURE — 99233 SBSQ HOSP IP/OBS HIGH 50: CPT | Mod: GC

## 2021-04-02 RX ORDER — METOPROLOL TARTRATE 50 MG
50 TABLET ORAL EVERY 12 HOURS
Refills: 0 | Status: DISCONTINUED | OUTPATIENT
Start: 2021-04-02 | End: 2021-04-07

## 2021-04-02 RX ADMIN — PIPERACILLIN AND TAZOBACTAM 200 GRAM(S): 4; .5 INJECTION, POWDER, LYOPHILIZED, FOR SOLUTION INTRAVENOUS at 16:58

## 2021-04-02 RX ADMIN — SENNA PLUS 2 TABLET(S): 8.6 TABLET ORAL at 21:52

## 2021-04-02 RX ADMIN — Medication 3 MILLILITER(S): at 11:34

## 2021-04-02 RX ADMIN — Medication 3 MILLILITER(S): at 17:01

## 2021-04-02 RX ADMIN — Medication 25 MILLIGRAM(S): at 06:09

## 2021-04-02 RX ADMIN — Medication 50 MILLIGRAM(S): at 17:01

## 2021-04-02 RX ADMIN — Medication 3 MILLILITER(S): at 06:09

## 2021-04-02 RX ADMIN — PIPERACILLIN AND TAZOBACTAM 200 GRAM(S): 4; .5 INJECTION, POWDER, LYOPHILIZED, FOR SOLUTION INTRAVENOUS at 09:08

## 2021-04-02 RX ADMIN — PANTOPRAZOLE SODIUM 40 MILLIGRAM(S): 20 TABLET, DELAYED RELEASE ORAL at 06:10

## 2021-04-02 RX ADMIN — APIXABAN 5 MILLIGRAM(S): 2.5 TABLET, FILM COATED ORAL at 06:09

## 2021-04-02 RX ADMIN — Medication 3 MILLILITER(S): at 00:17

## 2021-04-02 RX ADMIN — APIXABAN 5 MILLIGRAM(S): 2.5 TABLET, FILM COATED ORAL at 17:01

## 2021-04-02 RX ADMIN — Medication 81 MILLIGRAM(S): at 11:34

## 2021-04-02 RX ADMIN — PIPERACILLIN AND TAZOBACTAM 200 GRAM(S): 4; .5 INJECTION, POWDER, LYOPHILIZED, FOR SOLUTION INTRAVENOUS at 04:17

## 2021-04-02 RX ADMIN — ATORVASTATIN CALCIUM 40 MILLIGRAM(S): 80 TABLET, FILM COATED ORAL at 21:51

## 2021-04-02 RX ADMIN — PIPERACILLIN AND TAZOBACTAM 200 GRAM(S): 4; .5 INJECTION, POWDER, LYOPHILIZED, FOR SOLUTION INTRAVENOUS at 21:52

## 2021-04-02 NOTE — PROGRESS NOTE ADULT - PROBLEM SELECTOR PLAN 7
<resolved> Unknown baseline.   - 4/1 Cr improved: 1.33-> 1.11 -> 1.02   -renally dose meds/ avoid nephrotoxins Unknown baseline. Came in with SIENNA that resolved but with SIENNA on 4/3.  -renally dose meds/ avoid nephrotoxins

## 2021-04-02 NOTE — PROGRESS NOTE ADULT - PROBLEM SELECTOR PLAN 5
Patient alert and oriented 1-2, currently at his baseline.  - has 24/7 aid, contact number [phone]  - nurse involved in patient's care ([phone])  - PT consult

## 2021-04-02 NOTE — DIETITIAN INITIAL EVALUATION ADULT. - ADD RECOMMEND
1. Recommend SLP eval to determine safest diet 2. Encourage PO intake, provide assistance with meals prn 3. Trend wts 4. Monitor lytes and replete 5. RD to remain available prn

## 2021-04-02 NOTE — PROGRESS NOTE ADULT - ATTENDING COMMENTS
Patient discussed with resident team and plan of care reviewed. I have personally reviewed all pertinent labs and imaging and performed an independent history and physical. Resident note personally reviewed, and I agree with above resident note with the following additions:    86YOM with history of dementia (baseline AAOx1), essential HTN, BPH s/p suprapubic catheter admitted for acute hypoxemic respiratory failure 2/2 COPD exacerbation and severe sepsis with lactic acidosis and acute metabolic encephalopathy 2/2 UTI. Also with suspected new onset afib with RVR. Improving with abx and stepped down to RMF 3/31.    Clinically improving, will increase metoprolol today given HR still slightly above goal 110. Weaned off oxygen. Urine cultures growing Pseudomonas resistant to fluoroquinolones so PO options limited. Will ask lab to check sensitivity for fosfomycin, otherwise he may need to remain inpatient to finish IV abx for his UTI.

## 2021-04-02 NOTE — PROGRESS NOTE ADULT - ASSESSMENT
85yo M with PMH of Dementia (AAO x ?1 at baseline), HTN, BPH with chronic suprapubic catheter was BIBEMS with complaint of SOB and ?AMS, and was found to be in acute hypoxic respiratory failure with ED exam notable for wheezes, AFib with RVR and sepsis likely 2/2 UTI.

## 2021-04-02 NOTE — PROGRESS NOTE ADULT - SUBJECTIVE AND OBJECTIVE BOX
*** INCOMPLETE ***    OVERNIGHT EVENTS: As per overnight staff, there were no acute events overnight    INTERVAL EVENTS:    SUBJECTIVE HPI: Patient seen and examined at bedside. Patient resting comfortably, no complaints at this time. Patient denies: fever, chills, weakness, dizziness, headaches, changes in vision, chest pain, palpitations, shortness of breath, cough, N/V, diarrhea or constipation, dysuria, LE edema. ROS otherwise negative.      VITAL SIGNS:  Vital Signs Last 24 Hrs  T(C): 37.1 (02 Apr 2021 08:29), Max: 37.1 (02 Apr 2021 08:29)  T(F): 98.7 (02 Apr 2021 08:29), Max: 98.7 (02 Apr 2021 08:29)  HR: 98 (02 Apr 2021 08:29) (98 - 113)  BP: 118/77 (02 Apr 2021 08:29) (95/54 - 122/86)  BP(mean): --  RR: 18 (02 Apr 2021 08:29) (18 - 19)  SpO2: 92% (02 Apr 2021 08:29) (90% - 92%)      04-01-21 @ 07:01  -  04-02-21 @ 07:00  --------------------------------------------------------  IN: 200 mL / OUT: 1750 mL / NET: -1550 mL        PHYSICAL EXAM:  General: Comfortable, pleasant/anxious/agitated, Ill-appearing, well-nourished/frail/cachectic, comfortable / in distress  Neurological: AAOx3, no focal deficits  HEENT: NC/AT; EOMI, PERRL, clear conjunctiva, no nasal or oropharyngeal discharge or exudates, MMM  Neck: supple, no cervical or post-auricular lymphadenopathy  Cardiovascular: +S1/S2, no murmurs/rubs/gallops, RRR  Respiratory: CTA B/L, no diminished breath sounds, no wheezes/rales/rhonchi, no increased work of breathing or accessory muscle use  Gastrointestinal: soft, NT/ND; active BSx4 quadrants  Genitourinary: no suprapubic tenderness, no CVA tenderness  Extremities: WWP; no edema, clubbing or cyanosis  Vascular: 2+ radial, DP/PT pulses B/L  Skin: no rashes  Lines/Drains:       MEDICATIONS:  MEDICATIONS  (STANDING):  albuterol/ipratropium for Nebulization 3 milliLiter(s) Nebulizer every 6 hours  apixaban 5 milliGRAM(s) Oral every 12 hours  aspirin enteric coated 81 milliGRAM(s) Oral daily  atorvastatin 40 milliGRAM(s) Oral at bedtime  metoprolol tartrate 50 milliGRAM(s) Oral every 12 hours  pantoprazole    Tablet 40 milliGRAM(s) Oral before breakfast  piperacillin/tazobactam IVPB.. 4.5 Gram(s) IV Intermittent every 6 hours  senna 2 Tablet(s) Oral at bedtime    MEDICATIONS  (PRN):      ALLERGIES/INTOLERANCES:  Allergies    Allergy Status Unknown    Intolerances        LABS:                        15.2   10.24 )-----------( 169      ( 01 Apr 2021 09:16 )             47.6     04-01    144  |  109<H>  |  20  ----------------------------<  113<H>  4.4   |  24  |  1.02    Ca    9.2      01 Apr 2021 09:16  Phos  3.0     04-01  Mg     2.2     04-01    TPro  6.7  /  Alb  2.8<L>  /  TBili  0.4  /  DBili  x   /  AST  38  /  ALT  33  /  AlkPhos  107  04-01    PTT - ( 31 Mar 2021 18:38 )  PTT:46.1 sec  CAPILLARY BLOOD GLUCOSE                        Microbiology:    Culture - Urine (collected 30 Mar 2021 18:29)  Source: .Urine Clean Catch (Midstream)  Final Report (01 Apr 2021 20:15):    >100,000 CFU/ml Pseudomonas aeruginosa  Organism: Pseudomonas aeruginosa (01 Apr 2021 20:15)  Organism: Pseudomonas aeruginosa (01 Apr 2021 20:15)    Culture - Blood (collected 30 Mar 2021 18:22)  Source: .Blood Blood-Peripheral  Preliminary Report (31 Mar 2021 19:01):    No growth to date.    Culture - Blood (collected 30 Mar 2021 18:22)  Source: .Blood Blood-Peripheral  Preliminary Report (31 Mar 2021 19:01):    No growth to date.        RADIOLOGY, EKG AND ADDITIONAL TESTS: Reviewed. OVERNIGHT EVENTS: @6AM, , pt otherwise HD stable, afebrile. VS taken just prior to administration of AM lopressor dose. Seen at bedside just after pt received lopressor, radial pulse noted be irregular w/ pulse ranging b/t 100-120s,  remained stable    INTERVAL EVENTS:    SUBJECTIVE HPI: Patient seen and examined at bedside. Patient resting comfortably, no complaints at this time, more sleepy in the AM. Patient denies: headaches, chest pain, palpitations, shortness of breath. ROS otherwise negative.        VITAL SIGNS:  Vital Signs Last 24 Hrs  T(C): 37.1 (02 Apr 2021 08:29), Max: 37.1 (02 Apr 2021 08:29)  T(F): 98.7 (02 Apr 2021 08:29), Max: 98.7 (02 Apr 2021 08:29)  HR: 98 (02 Apr 2021 08:29) (98 - 113)  BP: 118/77 (02 Apr 2021 08:29) (95/54 - 122/86)  BP(mean): --  RR: 18 (02 Apr 2021 08:29) (18 - 19)  SpO2: 92% (02 Apr 2021 08:29) (90% - 92%)      04-01-21 @ 07:01  -  04-02-21 @ 07:00  --------------------------------------------------------  IN: 200 mL / OUT: 1750 mL / NET: -1550 mL        PHYSICAL EXAM:  General: Comfortable, pleasant, well-nourished in NAD  Neurological: AAOx1  HEENT: NC/AT; clear conjunctiva, no nasal or oropharyngeal discharge or exudates, MMM  Neck: supple, no cervical or post-auricular lymphadenopathy  Cardiovascular: +S1/S2, no murmurs/rubs/gallops, RRR  Respiratory: CTA B/L, no diminished breath sounds, no wheezes/rales/rhonchi, no increased work of breathing or accessory muscle use  Gastrointestinal: soft, NT/ND; active BSx4 quadrants  Genitourinary: suprapubic catheter in place, no CVA tenderness  Extremities: WWP; no edema, clubbing or cyanosis  Vascular: 2+ radial, DP/PT pulses B/L  Skin: no rashes  Lines/Drains:       MEDICATIONS:  MEDICATIONS  (STANDING):  albuterol/ipratropium for Nebulization 3 milliLiter(s) Nebulizer every 6 hours  apixaban 5 milliGRAM(s) Oral every 12 hours  aspirin enteric coated 81 milliGRAM(s) Oral daily  atorvastatin 40 milliGRAM(s) Oral at bedtime  metoprolol tartrate 50 milliGRAM(s) Oral every 12 hours  pantoprazole    Tablet 40 milliGRAM(s) Oral before breakfast  piperacillin/tazobactam IVPB.. 4.5 Gram(s) IV Intermittent every 6 hours  senna 2 Tablet(s) Oral at bedtime    MEDICATIONS  (PRN):      ALLERGIES/INTOLERANCES:  Allergies    Allergy Status Unknown    Intolerances        LABS:                        15.2   10.24 )-----------( 169      ( 01 Apr 2021 09:16 )             47.6     04-01    144  |  109<H>  |  20  ----------------------------<  113<H>  4.4   |  24  |  1.02    Ca    9.2      01 Apr 2021 09:16  Phos  3.0     04-01  Mg     2.2     04-01    TPro  6.7  /  Alb  2.8<L>  /  TBili  0.4  /  DBili  x   /  AST  38  /  ALT  33  /  AlkPhos  107  04-01    PTT - ( 31 Mar 2021 18:38 )  PTT:46.1 sec  CAPILLARY BLOOD GLUCOSE                        Microbiology:    Culture - Urine (collected 30 Mar 2021 18:29)  Source: .Urine Clean Catch (Midstream)  Final Report (01 Apr 2021 20:15):    >100,000 CFU/ml Pseudomonas aeruginosa  Organism: Pseudomonas aeruginosa (01 Apr 2021 20:15)  Organism: Pseudomonas aeruginosa (01 Apr 2021 20:15)    Culture - Blood (collected 30 Mar 2021 18:22)  Source: .Blood Blood-Peripheral  Preliminary Report (31 Mar 2021 19:01):    No growth to date.    Culture - Blood (collected 30 Mar 2021 18:22)  Source: .Blood Blood-Peripheral  Preliminary Report (31 Mar 2021 19:01):    No growth to date.        RADIOLOGY, EKG AND ADDITIONAL TESTS: Reviewed.

## 2021-04-02 NOTE — DIETITIAN INITIAL EVALUATION ADULT. - OTHER INFO
87yo M with PMH of Dementia (AAO x ?1 at baseline), HTN, BPH with chronic suprapubic catheter was BIBEMS with complaint of SOB and ?AMS, and was found to be in acute hypoxic respiratory failure with ED exam notable for wheezes, AFib with RVR and sepsis likely 2/2 UTI.     Pt seen in room, sleeping at time of visit. Pt currently on DASH diet. Per PCA in room, pt coughing with meals. Recommend SLP eval to determine safest diet. No prior wts to assess, ABW 150lbs. No reports of GI distress, last BM 3/31. 1+edema to right and left leg. Skin with blanchable redness to sacrum. No pain noted. Please see recs below. Will continue to follow per RD protocol.

## 2021-04-02 NOTE — PROGRESS NOTE ADULT - PROBLEM SELECTOR PLAN 2
i/s/o indwelling suprapubic catheter, suprapubic tenderness, positive UA. Now afebrile after Ceftriaxone started.   - s/p ceftriaxone 1g -> increased to 2g -> zosyn given history of indwelling estrada for 5 day course  - 3/31 PM transitioned back to ctx 2g  - urine cultures + psuedomonas   - follow blood cultures - NGTD i/s/o indwelling suprapubic catheter, suprapubic tenderness, positive UA. Now afebrile after Ceftriaxone started.   - s/p ceftriaxone 1g -> increased to 2g -> zosyn given history of indwelling estrada for 5 day course  - 3/31 PM transitioned back to ctx 2g  - urine cultures + psuedomonas - sensitivites have resulted  - follow blood cultures - NGTD

## 2021-04-03 DIAGNOSIS — N40.1 BENIGN PROSTATIC HYPERPLASIA WITH LOWER URINARY TRACT SYMPTOMS: ICD-10-CM

## 2021-04-03 DIAGNOSIS — I48.91 UNSPECIFIED ATRIAL FIBRILLATION: ICD-10-CM

## 2021-04-03 DIAGNOSIS — J96.01 ACUTE RESPIRATORY FAILURE WITH HYPOXIA: ICD-10-CM

## 2021-04-03 DIAGNOSIS — F03.90 UNSPECIFIED DEMENTIA WITHOUT BEHAVIORAL DISTURBANCE: ICD-10-CM

## 2021-04-03 DIAGNOSIS — E87.6 HYPOKALEMIA: ICD-10-CM

## 2021-04-03 DIAGNOSIS — T83.510A INFECTION AND INFLAMMATORY REACTION DUE TO CYSTOSTOMY CATHETER, INITIAL ENCOUNTER: ICD-10-CM

## 2021-04-03 LAB
ALBUMIN SERPL ELPH-MCNC: 2.7 G/DL — LOW (ref 3.3–5)
ALP SERPL-CCNC: 91 U/L — SIGNIFICANT CHANGE UP (ref 40–120)
ALT FLD-CCNC: 66 U/L — HIGH (ref 10–45)
ANION GAP SERPL CALC-SCNC: 12 MMOL/L — SIGNIFICANT CHANGE UP (ref 5–17)
AST SERPL-CCNC: 47 U/L — HIGH (ref 10–40)
BASOPHILS # BLD AUTO: 0.04 K/UL — SIGNIFICANT CHANGE UP (ref 0–0.2)
BASOPHILS NFR BLD AUTO: 0.5 % — SIGNIFICANT CHANGE UP (ref 0–2)
BILIRUB SERPL-MCNC: 0.8 MG/DL — SIGNIFICANT CHANGE UP (ref 0.2–1.2)
BUN SERPL-MCNC: 19 MG/DL — SIGNIFICANT CHANGE UP (ref 7–23)
CALCIUM SERPL-MCNC: 9 MG/DL — SIGNIFICANT CHANGE UP (ref 8.4–10.5)
CHLORIDE SERPL-SCNC: 106 MMOL/L — SIGNIFICANT CHANGE UP (ref 96–108)
CO2 SERPL-SCNC: 21 MMOL/L — LOW (ref 22–31)
CREAT SERPL-MCNC: 1.39 MG/DL — HIGH (ref 0.5–1.3)
EOSINOPHIL # BLD AUTO: 0.1 K/UL — SIGNIFICANT CHANGE UP (ref 0–0.5)
EOSINOPHIL NFR BLD AUTO: 1.3 % — SIGNIFICANT CHANGE UP (ref 0–6)
GLUCOSE SERPL-MCNC: 154 MG/DL — HIGH (ref 70–99)
HCT VFR BLD CALC: 50.2 % — HIGH (ref 39–50)
HGB BLD-MCNC: 16 G/DL — SIGNIFICANT CHANGE UP (ref 13–17)
IMM GRANULOCYTES NFR BLD AUTO: 0.5 % — SIGNIFICANT CHANGE UP (ref 0–1.5)
LYMPHOCYTES # BLD AUTO: 1.5 K/UL — SIGNIFICANT CHANGE UP (ref 1–3.3)
LYMPHOCYTES # BLD AUTO: 19.2 % — SIGNIFICANT CHANGE UP (ref 13–44)
MAGNESIUM SERPL-MCNC: 2 MG/DL — SIGNIFICANT CHANGE UP (ref 1.6–2.6)
MCHC RBC-ENTMCNC: 28.8 PG — SIGNIFICANT CHANGE UP (ref 27–34)
MCHC RBC-ENTMCNC: 31.9 GM/DL — LOW (ref 32–36)
MCV RBC AUTO: 90.5 FL — SIGNIFICANT CHANGE UP (ref 80–100)
MONOCYTES # BLD AUTO: 0.39 K/UL — SIGNIFICANT CHANGE UP (ref 0–0.9)
MONOCYTES NFR BLD AUTO: 5 % — SIGNIFICANT CHANGE UP (ref 2–14)
NEUTROPHILS # BLD AUTO: 5.75 K/UL — SIGNIFICANT CHANGE UP (ref 1.8–7.4)
NEUTROPHILS NFR BLD AUTO: 73.5 % — SIGNIFICANT CHANGE UP (ref 43–77)
NRBC # BLD: 0 /100 WBCS — SIGNIFICANT CHANGE UP (ref 0–0)
PHOSPHATE SERPL-MCNC: 3.8 MG/DL — SIGNIFICANT CHANGE UP (ref 2.5–4.5)
PLATELET # BLD AUTO: 221 K/UL — SIGNIFICANT CHANGE UP (ref 150–400)
POTASSIUM SERPL-MCNC: 4.6 MMOL/L — SIGNIFICANT CHANGE UP (ref 3.5–5.3)
POTASSIUM SERPL-SCNC: 4.6 MMOL/L — SIGNIFICANT CHANGE UP (ref 3.5–5.3)
PROT SERPL-MCNC: 6.7 G/DL — SIGNIFICANT CHANGE UP (ref 6–8.3)
RBC # BLD: 5.55 M/UL — SIGNIFICANT CHANGE UP (ref 4.2–5.8)
RBC # FLD: 14 % — SIGNIFICANT CHANGE UP (ref 10.3–14.5)
SODIUM SERPL-SCNC: 139 MMOL/L — SIGNIFICANT CHANGE UP (ref 135–145)
WBC # BLD: 7.82 K/UL — SIGNIFICANT CHANGE UP (ref 3.8–10.5)
WBC # FLD AUTO: 7.82 K/UL — SIGNIFICANT CHANGE UP (ref 3.8–10.5)

## 2021-04-03 PROCEDURE — 99233 SBSQ HOSP IP/OBS HIGH 50: CPT | Mod: GC

## 2021-04-03 RX ADMIN — ATORVASTATIN CALCIUM 40 MILLIGRAM(S): 80 TABLET, FILM COATED ORAL at 22:14

## 2021-04-03 RX ADMIN — SENNA PLUS 2 TABLET(S): 8.6 TABLET ORAL at 22:14

## 2021-04-03 RX ADMIN — Medication 3 MILLILITER(S): at 23:59

## 2021-04-03 RX ADMIN — PIPERACILLIN AND TAZOBACTAM 200 GRAM(S): 4; .5 INJECTION, POWDER, LYOPHILIZED, FOR SOLUTION INTRAVENOUS at 09:55

## 2021-04-03 RX ADMIN — Medication 50 MILLIGRAM(S): at 06:28

## 2021-04-03 RX ADMIN — Medication 3 MILLILITER(S): at 11:51

## 2021-04-03 RX ADMIN — PANTOPRAZOLE SODIUM 40 MILLIGRAM(S): 20 TABLET, DELAYED RELEASE ORAL at 06:28

## 2021-04-03 RX ADMIN — APIXABAN 5 MILLIGRAM(S): 2.5 TABLET, FILM COATED ORAL at 06:28

## 2021-04-03 RX ADMIN — PIPERACILLIN AND TAZOBACTAM 200 GRAM(S): 4; .5 INJECTION, POWDER, LYOPHILIZED, FOR SOLUTION INTRAVENOUS at 17:31

## 2021-04-03 RX ADMIN — APIXABAN 5 MILLIGRAM(S): 2.5 TABLET, FILM COATED ORAL at 17:31

## 2021-04-03 RX ADMIN — Medication 50 MILLIGRAM(S): at 17:31

## 2021-04-03 RX ADMIN — Medication 81 MILLIGRAM(S): at 11:51

## 2021-04-03 RX ADMIN — Medication 3 MILLILITER(S): at 06:27

## 2021-04-03 RX ADMIN — PIPERACILLIN AND TAZOBACTAM 200 GRAM(S): 4; .5 INJECTION, POWDER, LYOPHILIZED, FOR SOLUTION INTRAVENOUS at 22:14

## 2021-04-03 RX ADMIN — PIPERACILLIN AND TAZOBACTAM 200 GRAM(S): 4; .5 INJECTION, POWDER, LYOPHILIZED, FOR SOLUTION INTRAVENOUS at 05:10

## 2021-04-03 RX ADMIN — Medication 3 MILLILITER(S): at 17:32

## 2021-04-03 NOTE — CONSULT NOTE ADULT - TIME BILLING
-Pt seen and examined  -Currently in NAD, VSS, -110s; SBPs 120s  -TTE: Normal EF 65-70%; No significant valvular disease  -Chronic AF - c/w Lopressor 50 q12h; c/w Eliquis 5 BID - consider reduction to 2.5 BID if renal fxn worsens  -UTI mgmt as per primary team; May benefit from gentle IV hydration if PO intake inadequate    Yasmeen Sanchez MD  Cardiology Attending

## 2021-04-03 NOTE — PROGRESS NOTE ADULT - SUBJECTIVE AND OBJECTIVE BOX
***INCOMPLETE***  INTERVAL HPI/OVERNIGHT EVENTS:      On further ROS, patient did not have complaint of: Headache, Blurred Vision, Cough, Dyspnea, Palpitations, Abdominal Pain, N/V, Weakness, Change in Sensation.     VITAL SIGNS:  T(F): 98.4 (04-02-21 @ 21:36)  HR: 118 (04-02-21 @ 21:36)  BP: 112/87 (04-02-21 @ 21:36)  RR: 19 (04-02-21 @ 21:36)  SpO2: 94% (04-02-21 @ 21:36)  Wt(kg): --    Input & Output:    04-01-21 @ 07:01  -  04-02-21 @ 07:00  --------------------------------------------------------  IN: 200 mL / OUT: 1750 mL / NET: -1550 mL    04-02-21 @ 07:01  -  04-03-21 @ 05:35  --------------------------------------------------------  IN: 200 mL / OUT: 500 mL / NET: -300 mL        PHYSICAL EXAM:  General: Comfortable, pleasant/anxious/agitated, Ill-appearing, well-nourished/frail/cachectic, comfortable / in distress  Neurological: AAOx3, no focal deficits  HEENT: NC/AT; EOMI, PERRL, clear conjunctiva, no nasal or oropharyngeal discharge or exudates, MMM  Neck: supple, no cervical or post-auricular lymphadenopathy  Cardiovascular: +S1/S2, no murmurs/rubs/gallops, RRR  Respiratory: CTA B/L, no diminished breath sounds, no wheezes/rales/rhonchi, no increased work of breathing or accessory muscle use  Gastrointestinal: soft, NT/ND; active BSx4 quadrants  Genitourinary: no suprapubic tenderness, no CVA tenderness  Extremities: WWP; no edema, clubbing or cyanosis  Vascular: 2+ radial, DP/PT pulses B/L  Skin: no rashes  Lines/Drains:     MEDICATIONS  (STANDING):  albuterol/ipratropium for Nebulization 3 milliLiter(s) Nebulizer every 6 hours  apixaban 5 milliGRAM(s) Oral every 12 hours  aspirin enteric coated 81 milliGRAM(s) Oral daily  atorvastatin 40 milliGRAM(s) Oral at bedtime  metoprolol tartrate 50 milliGRAM(s) Oral every 12 hours  pantoprazole    Tablet 40 milliGRAM(s) Oral before breakfast  piperacillin/tazobactam IVPB.. 4.5 Gram(s) IV Intermittent every 6 hours  senna 2 Tablet(s) Oral at bedtime    MEDICATIONS  (PRN):      Allergies    Allergy Status Unknown    Intolerances        LABS:                        16.3   9.25  )-----------( 227      ( 02 Apr 2021 09:35 )             50.9     04-02    145  |  107  |  23  ----------------------------<  133<H>  4.2   |  27  |  1.51<H>    Ca    9.3      02 Apr 2021 09:35  Phos  4.4     04-02  Mg     2.1     04-02    TPro  6.7  /  Alb  2.8<L>  /  TBili  0.4  /  DBili  x   /  AST  38  /  ALT  33  /  AlkPhos  107  04-01          RADIOLOGY & ADDITIONAL TESTS:   INTERVAL HPI/OVERNIGHT EVENTS:  LEESA o/n, this AM pt redirectible, states he is not hungry but will try to eat. Physical exam benign and unchanged    On further ROS, patient did not have complaint of: Headache, Blurred Vision, Cough, Dyspnea, Palpitations, Abdominal Pain, N/V, Weakness, Change in Sensation.     VITAL SIGNS:  T(F): 98.4 (04-02-21 @ 21:36)  HR: 118 (04-02-21 @ 21:36)  BP: 112/87 (04-02-21 @ 21:36)  RR: 19 (04-02-21 @ 21:36)  SpO2: 94% (04-02-21 @ 21:36)  Wt(kg): --    Input & Output:    04-01-21 @ 07:01  -  04-02-21 @ 07:00  --------------------------------------------------------  IN: 200 mL / OUT: 1750 mL / NET: -1550 mL    04-02-21 @ 07:01  -  04-03-21 @ 05:35  --------------------------------------------------------  IN: 200 mL / OUT: 500 mL / NET: -300 mL      PHYSICAL EXAM:  General: Comfortable, pleasant, well-nourished in NAD  Neurological: AAOx1 to self  HEENT: NC/AT; clear conjunctiva, no nasal or oropharyngeal discharge or exudates, MMM  Neck: supple, no cervical or post-auricular lymphadenopathy  Cardiovascular: +S1/S2, no murmurs/rubs/gallops, RRR  Respiratory: CTA B/L, no diminished breath sounds, no wheezes/rales/rhonchi, no increased work of breathing or accessory muscle use  Gastrointestinal: soft, NT/ND; active BSx4 quadrants  Genitourinary: suprapubic catheter in place, no CVA tenderness  Extremities: WWP; no edema, clubbing or cyanosis  Vascular: 2+ radial, DP/PT pulses B/L      MEDICATIONS  (STANDING):  albuterol/ipratropium for Nebulization 3 milliLiter(s) Nebulizer every 6 hours  apixaban 5 milliGRAM(s) Oral every 12 hours  aspirin enteric coated 81 milliGRAM(s) Oral daily  atorvastatin 40 milliGRAM(s) Oral at bedtime  metoprolol tartrate 50 milliGRAM(s) Oral every 12 hours  pantoprazole    Tablet 40 milliGRAM(s) Oral before breakfast  piperacillin/tazobactam IVPB.. 4.5 Gram(s) IV Intermittent every 6 hours  senna 2 Tablet(s) Oral at bedtime    MEDICATIONS  (PRN):      Allergies    Allergy Status Unknown    Intolerances        LABS:                        16.3   9.25  )-----------( 227      ( 02 Apr 2021 09:35 )             50.9     04-02    145  |  107  |  23  ----------------------------<  133<H>  4.2   |  27  |  1.51<H>    Ca    9.3      02 Apr 2021 09:35  Phos  4.4     04-02  Mg     2.1     04-02    TPro  6.7  /  Alb  2.8<L>  /  TBili  0.4  /  DBili  x   /  AST  38  /  ALT  33  /  AlkPhos  107  04-01          RADIOLOGY & ADDITIONAL TESTS:   INTERVAL HPI/OVERNIGHT EVENTS:  LEESA o/n, this AM pt redirectible, states he is not hungry but will try to eat. Physical exam benign and unchanged, CR downtrending, AST/ALT uptrending. HR remains in 110's.    On further ROS, patient did not have complaint of: Headache, Blurred Vision, Cough, Dyspnea, Palpitations, Abdominal Pain, N/V, Weakness, Change in Sensation.     VITAL SIGNS:  T(F): 98.4 (04-02-21 @ 21:36)  HR: 118 (04-02-21 @ 21:36)  BP: 112/87 (04-02-21 @ 21:36)  RR: 19 (04-02-21 @ 21:36)  SpO2: 94% (04-02-21 @ 21:36)  Wt(kg): --    Input & Output:    04-01-21 @ 07:01  -  04-02-21 @ 07:00  --------------------------------------------------------  IN: 200 mL / OUT: 1750 mL / NET: -1550 mL    04-02-21 @ 07:01  -  04-03-21 @ 05:35  --------------------------------------------------------  IN: 200 mL / OUT: 500 mL / NET: -300 mL      PHYSICAL EXAM:  General: Comfortable, pleasant, well-nourished in NAD  Neurological: AAOx1 to self  HEENT: NC/AT; clear conjunctiva, no nasal or oropharyngeal discharge or exudates, MMM  Neck: supple, no cervical or post-auricular lymphadenopathy  Cardiovascular: +S1/S2, no murmurs/rubs/gallops, RRR  Respiratory: CTA B/L, no diminished breath sounds, no wheezes/rales/rhonchi, no increased work of breathing or accessory muscle use  Gastrointestinal: soft, NT/ND; active BSx4 quadrants  Genitourinary: suprapubic catheter in place, no CVA tenderness  Extremities: WWP; no edema, clubbing or cyanosis  Vascular: 2+ radial, DP/PT pulses B/L      MEDICATIONS  (STANDING):  albuterol/ipratropium for Nebulization 3 milliLiter(s) Nebulizer every 6 hours  apixaban 5 milliGRAM(s) Oral every 12 hours  aspirin enteric coated 81 milliGRAM(s) Oral daily  atorvastatin 40 milliGRAM(s) Oral at bedtime  metoprolol tartrate 50 milliGRAM(s) Oral every 12 hours  pantoprazole    Tablet 40 milliGRAM(s) Oral before breakfast  piperacillin/tazobactam IVPB.. 4.5 Gram(s) IV Intermittent every 6 hours  senna 2 Tablet(s) Oral at bedtime    MEDICATIONS  (PRN):      Allergies    Allergy Status Unknown    Intolerances        LABS:                        16.3   9.25  )-----------( 227      ( 02 Apr 2021 09:35 )             50.9     04-02    145  |  107  |  23  ----------------------------<  133<H>  4.2   |  27  |  1.51<H>    Ca    9.3      02 Apr 2021 09:35  Phos  4.4     04-02  Mg     2.1     04-02    TPro  6.7  /  Alb  2.8<L>  /  TBili  0.4  /  DBili  x   /  AST  38  /  ALT  33  /  AlkPhos  107  04-01          RADIOLOGY & ADDITIONAL TESTS:

## 2021-04-03 NOTE — PROGRESS NOTE ADULT - SUBJECTIVE AND OBJECTIVE BOX
Patient is a 86y old  Male who presents with a chief complaint of SOB x 1 day (02 Apr 2021 13:09)      INTERVAL HPI/OVERNIGHT EVENTS:    Pt. seen and examined earlier today  No complaints elicited; ROS limited d/t dementia  Poor PO intake, per report    Review of Systems: 12 point review of systems otherwise negative    MEDICATIONS  (STANDING):  albuterol/ipratropium for Nebulization 3 milliLiter(s) Nebulizer every 6 hours  apixaban 5 milliGRAM(s) Oral every 12 hours  aspirin enteric coated 81 milliGRAM(s) Oral daily  atorvastatin 40 milliGRAM(s) Oral at bedtime  metoprolol tartrate 50 milliGRAM(s) Oral every 12 hours  pantoprazole    Tablet 40 milliGRAM(s) Oral before breakfast  piperacillin/tazobactam IVPB.. 4.5 Gram(s) IV Intermittent every 6 hours  senna 2 Tablet(s) Oral at bedtime    MEDICATIONS  (PRN):      Allergies    Allergy Status Unknown    Intolerances          Vital Signs Last 24 Hrs  T(C): 36 (03 Apr 2021 09:42), Max: 36.9 (02 Apr 2021 21:36)  T(F): 96.8 (03 Apr 2021 09:42), Max: 98.4 (02 Apr 2021 21:36)  HR: 117 (03 Apr 2021 09:42) (100 - 119)  BP: 112/80 (03 Apr 2021 09:42) (112/80 - 130/75)  BP(mean): --  RR: 18 (03 Apr 2021 09:42) (18 - 19)  SpO2: 94% (03 Apr 2021 09:42) (91% - 94%)  CAPILLARY BLOOD GLUCOSE          04-02 @ 07:01  -  04-03 @ 07:00  --------------------------------------------------------  IN: 200 mL / OUT: 950 mL / NET: -750 mL        Physical Exam:  (earlier today)  Daily     Daily   General:  comfortable-appearing in NAD  HEENT:  MMM  CV:  tachycardic, irregular S1S2  Lungs:  CTA B/L anteriorly, normal WOB on RA  Abdomen: soft NT ND  Skin:  WWP  :  +SPT  Neuro:  AAO1    LABS:                        16.0   7.82  )-----------( 221      ( 03 Apr 2021 06:10 )             50.2     04-03    139  |  106  |  19  ----------------------------<  154<H>  4.6   |  21<L>  |  1.39<H>    Ca    9.0      03 Apr 2021 06:10  Phos  3.8     04-03  Mg     2.0     04-03    TPro  6.7  /  Alb  2.7<L>  /  TBili  0.8  /  DBili  x   /  AST  47<H>  /  ALT  66<H>  /  AlkPhos  91  04-03

## 2021-04-03 NOTE — PROGRESS NOTE ADULT - PROBLEM SELECTOR PLAN 9
F - s/p 2.1 L   E - replete K >4, Mag >3, Phos > 2   N - DASH diet   A - Eliquis F - s/p 2.1 L   E - replete K>4, Mag >2, Phos > 2.5   N - DASH diet   A - Eliquis

## 2021-04-03 NOTE — PROGRESS NOTE ADULT - PROBLEM SELECTOR PLAN 5
Patient alert and oriented 1-2, currently at his baseline.  - has 24/7 aid, contact number [phone]  - nurse involved in patient's care ([phone])  - PT consult

## 2021-04-03 NOTE — PROGRESS NOTE ADULT - PROBLEM SELECTOR PLAN 4
in setting of sepsis; cont. beta-blocker, A/C w/ DOAC; TSH WNL, TTE mostly unremarkable; monitor serial EKGs; Cardiology consulted, f/u recs

## 2021-04-03 NOTE — CONSULT NOTE ADULT - ASSESSMENT
86M w/ dementia, HTN, chronic SPC c/b Sepsis d/t UTI further found ot be in Afib @ 118. He was started on metoprolol for rate control and xarelto switched to eliquis.     - agree with eliquis for AC  - can continue lopressor 50BID for now  - pt is near goal of HR<110 for rate control.   - continue holding home amlodipine  - would not further increase lopressor, rather would continue treating infection and maintain adequate fluid intake    d/w Dr. Sanchez    
per Internal Medicine    85 yo M with PMH of Dementia (AAO x ?1 at baseline), HTN, ??AF (script for Xarelto in surescripts) BPH with chronic suprapubic catheter was BIBEMS with complaint of SOB and ?AMS, and was found to be in acute hypoxic respiratory failure with ED exam notable for wheezes, AFib with RVR and sepsis likely 2/2 UTI. Now s/p 1 x nebs saturating 93-95% on RA, mental status at baseline, with rate controlled afib.     Problem/Plan - 1:  ·  Problem: Severe sepsis.  Plan: Met 2/4 SIRS (febrile to 38.2 in ED + tachycardia to 112+ lactate of 2.6 would make it severe sepsis. Sepsis likely 2/2 UTI as below. S/P fluid resuscitation with 2.1L.   -initially started on ceftriaxone 1g, increased to 2g, now dc'd in favor for zosyn given history of indwelling estrada.     Problem/Plan - 2:  ·  Problem: UTI (urinary tract infection).  Plan: i/s/o indwelling suprapubic catheter, suprapubic tenderness, positive UA. Now afebrile after Ceftriaxone started.   - initially started on ceftriaxone 1g, increased to 2g, now dc'd in favor for zosyn given history of indwelling estrada for 5 day course  - follow urine cultures   - follow blood cultures.     Problem/Plan - 3:  ·  Problem: R/O COVID-19.  Plan: RULED OUT.  Negative x2, low clinical suspicion. Respiratory failure deemed most likely d/t exacerbation of COPD in setting of smoking history as pt appeared to improve with duonebs.     Problem/Plan - 4:  ·  Problem: Respiratory failure.  Plan: Respiratory failure deemed most likely d/t exacerbation of COPD in setting of smoking history as pt appeared to improve with duonebs.  -s/p duonebs  -s/p solumedrol 125mg  -saturating well on 2L NC.     Problem/Plan - 5:  ·  Problem: Dementia.  Plan: Patient alert and oriented 1-2, currently at his baseline.     Problem/Plan - 6:  Problem: Hypertension. Plan: Norvasc 10  - now normotensive, resume as tolerated    #atrial fibrillation  Afib w/ RVR most likely 2/2 acute infection. ?New onset vs known Afib given script for Xarelto found in Superscripts  Now rate controlled with HR in 90s-100s. Treatment of infection will help with rate.  - Hep gtt, f/u PTT in am, consider switching to NOACS  - s/p 1 dose of lopressor, will hold for now given sepsis  - f/u formal med rec.    Problem/Plan - 7:  ·  Problem: R/O SIENNA (acute kidney injury).  Plan: SIENNA vs CKD vs SIENNA on CKD. Unknown baseline.   -f/u urine lytes   -renally dose meds/ avoid nephrotoxins.     Problem/Plan - 8:  ·  Problem: BPH (benign prostatic hyperplasia).  Plan: BPH w/ chronic suprapubic catheter (catheter placed 1 month ago per [facility])  - catheter changed overnight on 3/31  - will hold flomax given suprapubic cathether.     Problem/Plan - 9:  ·  Problem: Nutrition, metabolism, and development symptoms.  Plan: F - s/p 2.1 L   E - replete K >4, Mag >3, Phos > 2   N - DASH diet   A - Heparin drip for AF.

## 2021-04-03 NOTE — CONSULT NOTE ADULT - SUBJECTIVE AND OBJECTIVE BOX
Patient is a 86y old  Male who presents with a chief complaint of      HPI:  Patient unable to provide any history, limited history below obtained from the chart (ED provider spoke with [facility], whom writer was unable to reach).    HPI: 87yo M w/ PMH of Dementia(AAO x ?1 at baseline), HTN, BPH with chronic indwelling suprapubic catheter (placed 1 month ago per A), ??AF (script for Xarelto in Surescripts) was BIBEMS from home accompanied by A for evaluation of shortness of breath, which started today in the morning. No cough/fevers, no recent hospitalization. Apparently patient was also initially ?altered, but later in the ED his mental status returned to baseline, which is alert and oriented x 1, he is chow of state.     Upon my assessment, he appears comfortable. He thinks he is at [place]. He intermittently answers simple questions. Denies SOB, possibly admits to abdominal pain.     ED course:  VS T 101; 112; 128/68; 26; 95% on 6L NC->4L NC  Labs: WBC 11.16; Cr 1.33; Alk Phos 126; Pro ; COVID PCR negative X1; UA + LE, WBC, bacteria  Imaging: CXR: no consolidations/ infiltrates; EKG: AFib w/RVR  Course: 2L NC; CTX/Azithro; Solumedrol 125mg X1; Duonebs (given wheezes on exam in ED)       Home meds:    based on ED note:    Tamsulosin 0.4 mg daily  Omeprazole 20mg daily  Colace/Senna  Amlodipine 10 mg daily  ASA 81 mg daily  Atorvastatin    Surescript:  Xarelto 20m daily      SH: [place] of state. Unable to obtain proper history, ?he used to smoke.  Given limited history and no reliable med rec, will need collaterals during the day. [phone]. [phone] Nurse Aleksander who is familiar with patient,  [place] [phone].    Allergies: Unable to obtain, as per ED "NKDA"   (30 Mar 2021 20:33)      PAST MEDICAL & SURGICAL HISTORY:      MEDICATIONS  (STANDING):  albuterol/ipratropium for Nebulization 3 milliLiter(s) Nebulizer every 6 hours  apixaban 5 milliGRAM(s) Oral every 12 hours  aspirin enteric coated 81 milliGRAM(s) Oral daily  atorvastatin 40 milliGRAM(s) Oral at bedtime  metoprolol tartrate 12.5 milliGRAM(s) Oral every 12 hours  pantoprazole    Tablet 40 milliGRAM(s) Oral before breakfast  piperacillin/tazobactam IVPB.. 4.5 Gram(s) IV Intermittent every 6 hours  senna 2 Tablet(s) Oral at bedtime    MEDICATIONS  (PRN):  QUEtiapine 25 milliGRAM(s) Oral at bedtime PRN agitation      FAMILY HISTORY:      CBC Full  -  ( 2021 09:16 )  WBC Count : 10.24 K/uL  RBC Count : 5.18 M/uL  Hemoglobin : 15.2 g/dL  Hematocrit : 47.6 %  Platelet Count - Automated : 169 K/uL  Mean Cell Volume : 91.9 fl  Mean Cell Hemoglobin : 29.3 pg  Mean Cell Hemoglobin Concentration : 31.9 gm/dL  Auto Neutrophil # : 8.47 K/uL  Auto Lymphocyte # : 1.23 K/uL  Auto Monocyte # : 0.49 K/uL  Auto Eosinophil # : 0.00 K/uL  Auto Basophil # : 0.01 K/uL  Auto Neutrophil % : 82.7 %  Auto Lymphocyte % : 12.0 %  Auto Monocyte % : 4.8 %  Auto Eosinophil % : 0.0 %  Auto Basophil % : 0.1 %      04-    144  |  109<H>  |  20  ----------------------------<  113<H>  4.4   |  24  |  1.02    Ca    9.2      2021 09:16  Phos  3.0     04-  Mg     2.2     04-    TPro  6.7  /  Alb  2.8<L>  /  TBili  0.4  /  DBili  x   /  AST  38  /  ALT  33  /  AlkPhos  107  04-      Urinalysis Basic - ( 30 Mar 2021 11:34 )    Color: Yellow / Appearance: SL CLOUDY / S.015 / pH: x  Gluc: x / Ketone: NEGATIVE  / Bili: NEGATIVE / Urobili: 0.2 E.U./dL   Blood: x / Protein: 30 mg/dL / Nitrite: POSITIVE   Leuk Esterase: Moderate / RBC: < 5 /HPF / WBC > 10 /HPF   Sq Epi: x / Non Sq Epi: x / Bacteria: Many /HPF          Radiology:    < from: Xray Chest 1 View- PORTABLE-Urgent (Xray Chest 1 View- PORTABLE-Urgent .) (21 @ 12:38) >  EXAM:  XR CHEST PORTABLE URGENT 1V                          PROCEDURE DATE:  2021          INTERPRETATION:  Clinical history/reason for exam: Hypoxia.    Frontal chest.    COMPARISON: 2021.    Findings/  impression: Stable heart size, thoracic aortic calcification. Elevated right hemidiaphragm, bibasilar opacities. Stable bony structures. Chilaiditi syndrome.                Vital Signs Last 24 Hrs  T(C): 36.9 (2021 08:44), Max: 36.9 (2021 08:44)  T(F): 98.4 (2021 08:44), Max: 98.4 (2021 08:44)  HR: 97 (2021 08:44) (95 - 110)  BP: 121/71 (2021 08:44) (109/78 - 130/83)  BP(mean): --  RR: 18 (2021 08:44) (18 - 20)  SpO2: 92% (2021 08:44) (92% - 96%)        REVIEW OF SYSTEMS:    CONSTITUTIONAL: No fever, weight loss, or fatigue  EYES: No eye pain, visual disturbances, or discharge  ENMT:  No difficulty hearing, tinnitus, vertigo; No sinus or throat pain  NECK: No pain or stiffness  BREASTS: No pain, masses, or nipple discharge  RESPIRATORY:   shortness of breath  CARDIOVASCULAR: No chest pain, palpitations, dizziness, or leg swelling  GASTROINTESTINAL: No abdominal or epigastric pain. No nausea, vomiting, or hematemesis; No diarrhea or constipation. No melena or hematochezia.  GENITOURINARY: No dysuria, frequency, hematuria, or incontinence  NEUROLOGICAL: No headaches, memory loss, loss of strength, numbness, or tremors  SKIN: No itching, burning, rashes, or lesions   LYMPH NODES: No enlarged glands  ENDOCRINE: No heat or cold intolerance; No hair loss  MUSCULOSKELETAL: No joint pain or swelling; No muscle, back, or extremity pain  PSYCHIATRIC: No depression, anxiety, mood swings, or difficulty sleeping  HEME/LYMPH: No easy bruising, or bleeding gums  ALLERGY AND IMMUNOLOGIC: No hives or eczema  VASCULAR: no swelling, erythema,   : no dysuria, hematuria, frequency        Physical Exam:  WDWN 85 yo  gentleman lying in semi Maxwell's position, awake/alert, confused     Head: normocephalic, atraumatic    Eyes: PERRLA, EOMI, no nystagmus, sclera anicteric    ENT: nasal discharge, uvula midline, no oropharyngeal erythema/exudate    Neck: supple, negative JVD, negative carotid bruits, no thyromegaly    Chest: CTA bilaterally, neg wheeze/ rhonchi/ rales/ crackles/ egophany    Cardiovascular: regular rate and rhythm, neg murmurs/rubs/gallops    Abdomen: soft, non distended, non tender to palpation in all 4 quadrants, negative rebound/guarding, normal bowel sounds    Extremities: WWP, neg cyanosis/clubbing/edema, negative calf tenderness to palpation, negative Chely's sign    Musculoskeletal:    Skin:      : + condom cath    Neurologic Exam:    Alert and oriented x 1 to person, speech fluent w/o dysarthria, follows commands    Cranial Nerves:     II:                         pupils equal, round and reactive to light, visual fields intact   III/ IV/VI:             extraocular movements intact, neg nystagmus, neg ptosis  V:                        facial sensation intact, V1-3 normal  VII:                      face symmetric, no droop, normal eye closure and smile  VIII:                     hearing intact to finger rub bilaterally  IX and X:             no hoarseness, gag intact, palate/ uvula rise symmetrically  XI:                       SCM/ trapezius strength intact bilateral  XII:                      no tongue deviation    Motor Exam:    Right UE:           > 3+/5    Left UE:             > 3+/5      Right LE:            > 3+/5    Left LE:              > 3+/5               Sensation:        Intact to light touch x 4 extremities                                                  DTR:                  biceps/brachioradialis: equal bilaterally                                                        patella/ankle: equal bilaterally                                                       neg clonus                           neg Babinski                             Gait:  not tested        PM&R Impression:    1) deconditioned  2) no focal weakness    Plan: cleared to begin rehab    1) Physical therapy focusing on therapeutic exercises, bed mobility/transfer out of bed evaluation, progressive ambulation with assistive devices prn.    2) Anticipated Disposition Plan/Recs:    pending functional progress                  
HPI:  Patient unable to provide any history, history from chart    87yo M w/ PMH of Dementia (AAO x ?1 at baseline, minimally verbal, moans yes and no), HTN, BPH with chronic indwelling suprapubic catheter (placed 1 month ago per [facility]), ?AF (script for Xarelto in Surescripts) was BIBEMS from home accompanied by [facility] for evaluation of shortness of breath. His w/u revealed sepsis d/t UTI and ECG with Afib @ 118 so cardiology was consulted. Pt denies pain or dyspnea.       Home meds:    Tamsulosin 0.4 mg daily  Omeprazole 20mg daily  Colace/Senna  Amlodipine 10 mg daily  ASA 81 mg daily  Atorvastatin    Surescript:  Xarelto 20m daily    SH: [facility] of [facility]. Unable to obtain proper history    Allergies: Unable to obtain    ROS: A 10-point review of systems was otherwise negative.    PAST MEDICAL & SURGICAL HISTORY:    SOCIAL HISTORY:  FAMILY HISTORY:    ALLERGIES: 	  Allergy Status Unknown    MEDICATIONS:  albuterol/ipratropium for Nebulization 3 milliLiter(s) Nebulizer every 6 hours  apixaban 5 milliGRAM(s) Oral every 12 hours  aspirin enteric coated 81 milliGRAM(s) Oral daily  atorvastatin 40 milliGRAM(s) Oral at bedtime  metoprolol tartrate 50 milliGRAM(s) Oral every 12 hours  pantoprazole    Tablet 40 milliGRAM(s) Oral before breakfast  piperacillin/tazobactam IVPB.. 4.5 Gram(s) IV Intermittent every 6 hours  senna 2 Tablet(s) Oral at bedtime      PHYSICAL EXAM:  T(C): 36 (04-03-21 @ 09:42), Max: 36.9 (04-02-21 @ 21:36)  HR: 117 (04-03-21 @ 09:42) (100 - 119)  BP: 112/80 (04-03-21 @ 09:42) (112/80 - 130/75)  RR: 18 (04-03-21 @ 09:42) (18 - 19)  SpO2: 94% (04-03-21 @ 09:42) (91% - 94%)  Wt(kg): --    frail appearing, moaning to questions but in no apparent distress  neck supple no JVD  Heart irreg irreg , no m  Lungs CTAB  Ext wwp no edema    I&O's Summary    02 Apr 2021 07:01  -  03 Apr 2021 07:00  --------------------------------------------------------  IN: 200 mL / OUT: 950 mL / NET: -750 mL      LABS:	 	                        16.0   7.82  )-----------( 221      ( 03 Apr 2021 06:10 )             50.2     04-03    139  |  106  |  19  ----------------------------<  154<H>  4.6   |  21<L>  |  1.39<H>    Ca    9.0      03 Apr 2021 06:10  Phos  3.8     04-03  Mg     2.0     04-03    TPro  6.7  /  Alb  2.7<L>  /  TBili  0.8  /  DBili  x   /  AST  47<H>  /  ALT  66<H>  /  AlkPhos  91  04-03      ECG - Afib ~118, non specfic STTW changes

## 2021-04-03 NOTE — PROGRESS NOTE ADULT - PROBLEM SELECTOR PLAN 7
Unknown baseline. Came in with SIENNA that resolved but with SIENNA on 4/3.  -renally dose meds/ avoid nephrotoxins

## 2021-04-03 NOTE — PROGRESS NOTE ADULT - PROBLEM SELECTOR PLAN 3
Per collateral, patient has no history of afib but was found to have afib w/ RVR likely in setting of sepsis. Placed on heparin gtt and rate control w/ metoprolol  - c/w eliquis 5 BID   - 4/1 increased metoprolol 25 BID Per collateral, patient has no history of afib but was found to have afib w/ RVR likely in setting of sepsis. Placed on heparin gtt and rate control w/ metoprolol  - c/w eliquis 5 BID   - 4/2 increased metoprolol 50 BID

## 2021-04-04 LAB
ALBUMIN SERPL ELPH-MCNC: 2.7 G/DL — LOW (ref 3.3–5)
ALP SERPL-CCNC: 86 U/L — SIGNIFICANT CHANGE UP (ref 40–120)
ALT FLD-CCNC: 64 U/L — HIGH (ref 10–45)
ANION GAP SERPL CALC-SCNC: 12 MMOL/L — SIGNIFICANT CHANGE UP (ref 5–17)
AST SERPL-CCNC: 39 U/L — SIGNIFICANT CHANGE UP (ref 10–40)
BILIRUB SERPL-MCNC: 0.9 MG/DL — SIGNIFICANT CHANGE UP (ref 0.2–1.2)
BUN SERPL-MCNC: 16 MG/DL — SIGNIFICANT CHANGE UP (ref 7–23)
CALCIUM SERPL-MCNC: 9 MG/DL — SIGNIFICANT CHANGE UP (ref 8.4–10.5)
CHLORIDE SERPL-SCNC: 110 MMOL/L — HIGH (ref 96–108)
CO2 SERPL-SCNC: 21 MMOL/L — LOW (ref 22–31)
CREAT SERPL-MCNC: 1.35 MG/DL — HIGH (ref 0.5–1.3)
CULTURE RESULTS: SIGNIFICANT CHANGE UP
CULTURE RESULTS: SIGNIFICANT CHANGE UP
GLUCOSE SERPL-MCNC: 127 MG/DL — HIGH (ref 70–99)
HCT VFR BLD CALC: 51.7 % — HIGH (ref 39–50)
HGB BLD-MCNC: 16.7 G/DL — SIGNIFICANT CHANGE UP (ref 13–17)
MAGNESIUM SERPL-MCNC: 2.1 MG/DL — SIGNIFICANT CHANGE UP (ref 1.6–2.6)
MCHC RBC-ENTMCNC: 28.9 PG — SIGNIFICANT CHANGE UP (ref 27–34)
MCHC RBC-ENTMCNC: 32.3 GM/DL — SIGNIFICANT CHANGE UP (ref 32–36)
MCV RBC AUTO: 89.4 FL — SIGNIFICANT CHANGE UP (ref 80–100)
NRBC # BLD: 0 /100 WBCS — SIGNIFICANT CHANGE UP (ref 0–0)
PHOSPHATE SERPL-MCNC: 3.4 MG/DL — SIGNIFICANT CHANGE UP (ref 2.5–4.5)
PLATELET # BLD AUTO: 227 K/UL — SIGNIFICANT CHANGE UP (ref 150–400)
POTASSIUM SERPL-MCNC: 3.9 MMOL/L — SIGNIFICANT CHANGE UP (ref 3.5–5.3)
POTASSIUM SERPL-SCNC: 3.9 MMOL/L — SIGNIFICANT CHANGE UP (ref 3.5–5.3)
PROT SERPL-MCNC: 6.7 G/DL — SIGNIFICANT CHANGE UP (ref 6–8.3)
RBC # BLD: 5.78 M/UL — SIGNIFICANT CHANGE UP (ref 4.2–5.8)
RBC # FLD: 14 % — SIGNIFICANT CHANGE UP (ref 10.3–14.5)
SODIUM SERPL-SCNC: 143 MMOL/L — SIGNIFICANT CHANGE UP (ref 135–145)
SPECIMEN SOURCE: SIGNIFICANT CHANGE UP
SPECIMEN SOURCE: SIGNIFICANT CHANGE UP
WBC # BLD: 7.55 K/UL — SIGNIFICANT CHANGE UP (ref 3.8–10.5)
WBC # FLD AUTO: 7.55 K/UL — SIGNIFICANT CHANGE UP (ref 3.8–10.5)

## 2021-04-04 PROCEDURE — 99233 SBSQ HOSP IP/OBS HIGH 50: CPT | Mod: GC

## 2021-04-04 RX ADMIN — PIPERACILLIN AND TAZOBACTAM 200 GRAM(S): 4; .5 INJECTION, POWDER, LYOPHILIZED, FOR SOLUTION INTRAVENOUS at 09:22

## 2021-04-04 RX ADMIN — Medication 81 MILLIGRAM(S): at 12:03

## 2021-04-04 RX ADMIN — Medication 50 MILLIGRAM(S): at 17:46

## 2021-04-04 RX ADMIN — PIPERACILLIN AND TAZOBACTAM 200 GRAM(S): 4; .5 INJECTION, POWDER, LYOPHILIZED, FOR SOLUTION INTRAVENOUS at 22:17

## 2021-04-04 RX ADMIN — ATORVASTATIN CALCIUM 40 MILLIGRAM(S): 80 TABLET, FILM COATED ORAL at 22:16

## 2021-04-04 RX ADMIN — Medication 3 MILLILITER(S): at 17:46

## 2021-04-04 RX ADMIN — PIPERACILLIN AND TAZOBACTAM 200 GRAM(S): 4; .5 INJECTION, POWDER, LYOPHILIZED, FOR SOLUTION INTRAVENOUS at 04:33

## 2021-04-04 RX ADMIN — PIPERACILLIN AND TAZOBACTAM 200 GRAM(S): 4; .5 INJECTION, POWDER, LYOPHILIZED, FOR SOLUTION INTRAVENOUS at 16:14

## 2021-04-04 RX ADMIN — APIXABAN 5 MILLIGRAM(S): 2.5 TABLET, FILM COATED ORAL at 17:46

## 2021-04-04 RX ADMIN — SENNA PLUS 2 TABLET(S): 8.6 TABLET ORAL at 22:16

## 2021-04-04 RX ADMIN — Medication 50 MILLIGRAM(S): at 06:31

## 2021-04-04 RX ADMIN — Medication 3 MILLILITER(S): at 12:03

## 2021-04-04 RX ADMIN — APIXABAN 5 MILLIGRAM(S): 2.5 TABLET, FILM COATED ORAL at 06:31

## 2021-04-04 RX ADMIN — PANTOPRAZOLE SODIUM 40 MILLIGRAM(S): 20 TABLET, DELAYED RELEASE ORAL at 06:31

## 2021-04-04 RX ADMIN — Medication 3 MILLILITER(S): at 23:41

## 2021-04-04 RX ADMIN — Medication 3 MILLILITER(S): at 06:31

## 2021-04-04 NOTE — PROGRESS NOTE ADULT - PROBLEM SELECTOR PLAN 4
in setting of sepsis; cont. beta-blocker, A/C w/ DOAC; TSH WNL, TTE mostly unremarkable; monitor serial EKGs; appreciate Cardiology recs

## 2021-04-04 NOTE — PROGRESS NOTE ADULT - SUBJECTIVE AND OBJECTIVE BOX
Patient is a 86y old  Male who presents with a chief complaint of SOB x 1 day (02 Apr 2021 13:09)      INTERVAL HPI/OVERNIGHT EVENTS:    Pt. seen and examined earlier today  No complaints elicited; ROS limited d/t Pt.'s dementia    Review of Systems: 12 point review of systems otherwise negative    MEDICATIONS  (STANDING):  albuterol/ipratropium for Nebulization 3 milliLiter(s) Nebulizer every 6 hours  apixaban 5 milliGRAM(s) Oral every 12 hours  aspirin enteric coated 81 milliGRAM(s) Oral daily  atorvastatin 40 milliGRAM(s) Oral at bedtime  metoprolol tartrate 50 milliGRAM(s) Oral every 12 hours  pantoprazole    Tablet 40 milliGRAM(s) Oral before breakfast  piperacillin/tazobactam IVPB.. 4.5 Gram(s) IV Intermittent every 6 hours  senna 2 Tablet(s) Oral at bedtime    MEDICATIONS  (PRN):      Allergies    Allergy Status Unknown    Intolerances          Vital Signs Last 24 Hrs  T(C): 36.6 (04 Apr 2021 16:15), Max: 37.3 (03 Apr 2021 20:45)  T(F): 97.9 (04 Apr 2021 16:15), Max: 99.1 (03 Apr 2021 20:45)  HR: 86 (04 Apr 2021 16:15) (86 - 111)  BP: 107/70 (04 Apr 2021 16:15) (104/72 - 129/88)  BP(mean): --  RR: 19 (04 Apr 2021 16:15) (18 - 19)  SpO2: 92% (04 Apr 2021 16:15) (92% - 94%)  CAPILLARY BLOOD GLUCOSE          04-03 @ 07:01  -  04-04 @ 07:00  --------------------------------------------------------  IN: 200 mL / OUT: 450 mL / NET: -250 mL    04-04 @ 07:01  -  04-04 @ 16:53  --------------------------------------------------------  IN: 0 mL / OUT: 575 mL / NET: -575 mL        Physical Exam:  (earlier today)  Daily     Daily   General:  comfortable-appearing in NAD  HEENT:  MMM  CV:  irregular S1S2  Lungs:  CTA B/L anteriorly, normal WOB on RA  Abdomen: soft NT ND  Skin:  WWP  :  +SPT (POA)  Neuro:  AAO1    LABS:                        16.7   7.55  )-----------( 227      ( 04 Apr 2021 06:53 )             51.7     04-04    143  |  110<H>  |  16  ----------------------------<  127<H>  3.9   |  21<L>  |  1.35<H>    Ca    9.0      04 Apr 2021 06:53  Phos  3.4     04-04  Mg     2.1     04-04    TPro  6.7  /  Alb  2.7<L>  /  TBili  0.9  /  DBili  x   /  AST  39  /  ALT  64<H>  /  AlkPhos  86  04-04

## 2021-04-05 DIAGNOSIS — E87.0 HYPEROSMOLALITY AND HYPERNATREMIA: ICD-10-CM

## 2021-04-05 DIAGNOSIS — N17.9 ACUTE KIDNEY FAILURE, UNSPECIFIED: ICD-10-CM

## 2021-04-05 LAB
ANION GAP SERPL CALC-SCNC: 11 MMOL/L — SIGNIFICANT CHANGE UP (ref 5–17)
APTT BLD: 30.7 SEC — SIGNIFICANT CHANGE UP (ref 27.5–35.5)
BASOPHILS # BLD AUTO: 0.06 K/UL — SIGNIFICANT CHANGE UP (ref 0–0.2)
BASOPHILS NFR BLD AUTO: 0.7 % — SIGNIFICANT CHANGE UP (ref 0–2)
BUN SERPL-MCNC: 21 MG/DL — SIGNIFICANT CHANGE UP (ref 7–23)
CALCIUM SERPL-MCNC: 9 MG/DL — SIGNIFICANT CHANGE UP (ref 8.4–10.5)
CHLORIDE SERPL-SCNC: 114 MMOL/L — HIGH (ref 96–108)
CO2 SERPL-SCNC: 21 MMOL/L — LOW (ref 22–31)
CREAT SERPL-MCNC: 1.65 MG/DL — HIGH (ref 0.5–1.3)
EOSINOPHIL # BLD AUTO: 0.38 K/UL — SIGNIFICANT CHANGE UP (ref 0–0.5)
EOSINOPHIL NFR BLD AUTO: 4.2 % — SIGNIFICANT CHANGE UP (ref 0–6)
GLUCOSE SERPL-MCNC: 138 MG/DL — HIGH (ref 70–99)
HCT VFR BLD CALC: 52.5 % — HIGH (ref 39–50)
HGB BLD-MCNC: 16.7 G/DL — SIGNIFICANT CHANGE UP (ref 13–17)
IMM GRANULOCYTES NFR BLD AUTO: 0.7 % — SIGNIFICANT CHANGE UP (ref 0–1.5)
INR BLD: 1.5 — HIGH (ref 0.88–1.16)
LYMPHOCYTES # BLD AUTO: 2.11 K/UL — SIGNIFICANT CHANGE UP (ref 1–3.3)
LYMPHOCYTES # BLD AUTO: 23.4 % — SIGNIFICANT CHANGE UP (ref 13–44)
MAGNESIUM SERPL-MCNC: 2.2 MG/DL — SIGNIFICANT CHANGE UP (ref 1.6–2.6)
MCHC RBC-ENTMCNC: 29.1 PG — SIGNIFICANT CHANGE UP (ref 27–34)
MCHC RBC-ENTMCNC: 31.8 GM/DL — LOW (ref 32–36)
MCV RBC AUTO: 91.6 FL — SIGNIFICANT CHANGE UP (ref 80–100)
MONOCYTES # BLD AUTO: 0.53 K/UL — SIGNIFICANT CHANGE UP (ref 0–0.9)
MONOCYTES NFR BLD AUTO: 5.9 % — SIGNIFICANT CHANGE UP (ref 2–14)
NEUTROPHILS # BLD AUTO: 5.87 K/UL — SIGNIFICANT CHANGE UP (ref 1.8–7.4)
NEUTROPHILS NFR BLD AUTO: 65.1 % — SIGNIFICANT CHANGE UP (ref 43–77)
NRBC # BLD: 0 /100 WBCS — SIGNIFICANT CHANGE UP (ref 0–0)
PLATELET # BLD AUTO: 251 K/UL — SIGNIFICANT CHANGE UP (ref 150–400)
POTASSIUM SERPL-MCNC: 4.1 MMOL/L — SIGNIFICANT CHANGE UP (ref 3.5–5.3)
POTASSIUM SERPL-SCNC: 4.1 MMOL/L — SIGNIFICANT CHANGE UP (ref 3.5–5.3)
PROTHROM AB SERPL-ACNC: 17.6 SEC — HIGH (ref 10.6–13.6)
RBC # BLD: 5.73 M/UL — SIGNIFICANT CHANGE UP (ref 4.2–5.8)
RBC # FLD: 14 % — SIGNIFICANT CHANGE UP (ref 10.3–14.5)
SODIUM SERPL-SCNC: 146 MMOL/L — HIGH (ref 135–145)
WBC # BLD: 9.01 K/UL — SIGNIFICANT CHANGE UP (ref 3.8–10.5)
WBC # FLD AUTO: 9.01 K/UL — SIGNIFICANT CHANGE UP (ref 3.8–10.5)

## 2021-04-05 PROCEDURE — 99233 SBSQ HOSP IP/OBS HIGH 50: CPT

## 2021-04-05 PROCEDURE — 99233 SBSQ HOSP IP/OBS HIGH 50: CPT | Mod: GC

## 2021-04-05 RX ORDER — SODIUM CHLORIDE 9 MG/ML
1000 INJECTION INTRAMUSCULAR; INTRAVENOUS; SUBCUTANEOUS ONCE
Refills: 0 | Status: COMPLETED | OUTPATIENT
Start: 2021-04-05 | End: 2021-04-05

## 2021-04-05 RX ADMIN — Medication 50 MILLIGRAM(S): at 06:11

## 2021-04-05 RX ADMIN — Medication 81 MILLIGRAM(S): at 11:25

## 2021-04-05 RX ADMIN — Medication 3 MILLILITER(S): at 06:11

## 2021-04-05 RX ADMIN — PIPERACILLIN AND TAZOBACTAM 200 GRAM(S): 4; .5 INJECTION, POWDER, LYOPHILIZED, FOR SOLUTION INTRAVENOUS at 23:30

## 2021-04-05 RX ADMIN — PIPERACILLIN AND TAZOBACTAM 200 GRAM(S): 4; .5 INJECTION, POWDER, LYOPHILIZED, FOR SOLUTION INTRAVENOUS at 03:56

## 2021-04-05 RX ADMIN — APIXABAN 5 MILLIGRAM(S): 2.5 TABLET, FILM COATED ORAL at 18:12

## 2021-04-05 RX ADMIN — PIPERACILLIN AND TAZOBACTAM 200 GRAM(S): 4; .5 INJECTION, POWDER, LYOPHILIZED, FOR SOLUTION INTRAVENOUS at 18:12

## 2021-04-05 RX ADMIN — Medication 3 MILLILITER(S): at 11:25

## 2021-04-05 RX ADMIN — PIPERACILLIN AND TAZOBACTAM 200 GRAM(S): 4; .5 INJECTION, POWDER, LYOPHILIZED, FOR SOLUTION INTRAVENOUS at 09:41

## 2021-04-05 RX ADMIN — APIXABAN 5 MILLIGRAM(S): 2.5 TABLET, FILM COATED ORAL at 06:11

## 2021-04-05 RX ADMIN — Medication 50 MILLIGRAM(S): at 18:12

## 2021-04-05 RX ADMIN — PANTOPRAZOLE SODIUM 40 MILLIGRAM(S): 20 TABLET, DELAYED RELEASE ORAL at 06:11

## 2021-04-05 RX ADMIN — Medication 3 MILLILITER(S): at 18:12

## 2021-04-05 RX ADMIN — Medication 3 MILLILITER(S): at 23:30

## 2021-04-05 RX ADMIN — ATORVASTATIN CALCIUM 40 MILLIGRAM(S): 80 TABLET, FILM COATED ORAL at 21:49

## 2021-04-05 RX ADMIN — SENNA PLUS 2 TABLET(S): 8.6 TABLET ORAL at 21:49

## 2021-04-05 RX ADMIN — SODIUM CHLORIDE 1000 MILLILITER(S): 9 INJECTION INTRAMUSCULAR; INTRAVENOUS; SUBCUTANEOUS at 11:25

## 2021-04-05 NOTE — PROGRESS NOTE ADULT - PROBLEM SELECTOR PROBLEM 6
Benign prostatic hyperplasia with urinary retention Dementia without behavioral disturbance, unspecified dementia type

## 2021-04-05 NOTE — PROGRESS NOTE ADULT - ASSESSMENT
87 y/o M w/ 85yo M with PMH of Dementia (AAO x ?1 at baseline), HTN, BPH with chronic suprapubic catheter was BIBEMS with complaint of SOB and ?AMS, and was found to be in acute hypoxic respiratory failure with ED exam notable for wheezes, AFib with RVR and sepsis likely 2/2 UTI.

## 2021-04-05 NOTE — DISCHARGE NOTE PROVIDER - HOSPITAL COURSE
#Discharge: do not delete    Patient is 87 yo M with past medical history of dementia (A&Ox 1 at baseline), HTN, BPH with chronic suprapubic catheter     Presented with SOB and ?AMS, found to have acute hypoxic respiratory failure with ED exam notable for wheezes, AFib with RVR and sepsis likely 2/2 UTI    Problem List/Main Diagnoses (system-based):   #Urinary tract infection associated with cystostomy catheter, initial encounter  In setting of indwelling suprapubic catheter, suprapubic tenderness, positive UA.  Urine cultures + psuedomonas – resistant to fluoroquinolones.   - zosyn (- 4/6) given history of indwelling estrada for 5 day course    #COPD exacerbation  Acute hypoxic respiratory failure 2/2 COPD exacerbation  - resolved, stable on room air  - c/w home medicatiosn      #New onset a-fib  No prior history of afib but was found to have afib w/ RVR likely in setting of sepsis.   - c/w eliquis 5 BID   - c/w metoprolol 50 BID.     #Dementia without behavioral disturbance, unspecified dementia type  A&Ox1 for duration of hospital stay. Frequent re-orientation, assist w/ ADLs.    #Benign prostatic hyperplasia with urinary retention  BPH w/ chronic suprapubic catheter (catheter placed 1 month ago per [facility]). Takes flomax at home.   - catheter changed overnight on 3/31  - stop flomax given suprapubic catheter - per urology once patient has chronic indwelling estrada or suprapubic catheter.     #SIENNA (acute kidney injury)  Patient with SIENNA. Likely pre-renal given poor PO intake. Also may be secondary to medication. Improved with fluids.    Inpatient treatment course:   Admitted for management of sepsis 2/2 pseudomonal UTI and COPD exacerbation. Found to have new onset afib – started eliquis and Lopressor. Treated w/ unasyn for UTI. Acute hypoxic respiratory failure 2/2 COPD exacerbation resolved prior to discharge.     New medications: Lopressor, Eliquis  Labs to be followed outpatient: None  Exam to be followed outpatient: None   #Discharge: do not delete    Patient is 87 yo M with past medical history of dementia (A&Ox 1 at baseline, daniels of [facility]), HTN, BPH with chronic suprapubic catheter     Presented with SOB and ?AMS, found to have acute hypoxic respiratory failure with ED exam notable for wheezes, AFib with RVR and sepsis likely 2/2 UTI    Patient is daniels of [facility], legal guardian is Kenya Feliz ([phone]). If unable to reach, general line is [phone]    Problem List/Main Diagnoses (system-based):   #Urinary tract infection associated with cystostomy catheter, initial encounter  In setting of indwelling suprapubic catheter, suprapubic tenderness, positive UA.  Urine cultures + psuedomonas – resistant to fluoroquinolones.   - zosyn (- 4/6) given history of indwelling estrada for 5 day course    #COPD exacerbation  Acute hypoxic respiratory failure 2/2 COPD exacerbation  - resolved, stable on room air  - c/w home medicatiosn      #New onset a-fib  No prior history of afib but was found to have afib w/ RVR likely in setting of sepsis.   - c/w eliquis 5 BID   - c/w metoprolol 50 BID.     #Dementia without behavioral disturbance, unspecified dementia type  A&Ox1 for duration of hospital stay. Frequent re-orientation, assist w/ ADLs.    #Benign prostatic hyperplasia with urinary retention  BPH w/ chronic suprapubic catheter (catheter placed 1 month ago per [facility]). Takes flomax at home.   - catheter changed overnight on 3/31  - stop flomax given suprapubic catheter - per urology once patient has chronic indwelling estrada or suprapubic catheter.     #SIENNA (acute kidney injury)  Patient with SIENNA. Likely pre-renal given poor PO intake. Also may be secondary to medication. Improved with fluids      Inpatient treatment course:   Admitted for management of sepsis 2/2 pseudomonal UTI and COPD exacerbation. Found to have new onset afib – started eliquis and Lopressor. Treated w/ unasyn for UTI. Acute hypoxic respiratory failure 2/2 COPD exacerbation resolved prior to discharge.     New medications: Lopressor, Eliquis  Labs to be followed outpatient: None  Exam to be followed outpatient: None   #Discharge: do not delete    Patient is 87 yo M with past medical history of dementia (A&Ox 1 at baseline, daniels of [facility]), HTN, BPH with chronic suprapubic catheter     Presented with SOB and ?AMS, found to have acute hypoxic respiratory failure with ED exam notable for wheezes, AFib with RVR and sepsis likely 2/2 UTI    Patient is daniels of [facility], legal guardian is Kenya Feliz ([phone]). If unable to reach, general line is [phone]    Problem List/Main Diagnoses (system-based):   #Urinary tract infection associated with cystostomy catheter, initial encounter  In setting of indwelling suprapubic catheter, suprapubic tenderness, positive UA.  Urine cultures + psuedomonas – resistant to fluoroquinolones.   - zosyn (- 4/6) given history of indwelling estrada for 5 day course    #COPD exacerbation  Acute hypoxic respiratory failure 2/2 COPD exacerbation  - resolved, stable on room air  - c/w home medicatiosn      #New onset a-fib  No prior history of afib but was found to have afib w/ RVR likely in setting of sepsis. CHADVASC of at least 3 placing him at high thromboembolic risk.   - c/w eliquis 5 BID   - c/w metoprolol 50 BID.     #Dementia without behavioral disturbance, unspecified dementia type  A&Ox1 for duration of hospital stay. Frequent re-orientation, assist w/ ADLs.    #Benign prostatic hyperplasia with urinary retention  BPH w/ chronic suprapubic catheter (catheter placed 1 month ago per [facility]). Takes flomax at home.   - catheter changed overnight on 3/31  - stop flomax given suprapubic catheter - per urology once patient has chronic indwelling estrada or suprapubic catheter.     #SIENNA (acute kidney injury)  Patient with SIENNA. Likely pre-renal given poor PO intake. Also may be secondary to medication. Improved with fluids      Inpatient treatment course:   Admitted for management of sepsis 2/2 pseudomonal UTI and COPD exacerbation. Found to have new onset afib – started eliquis and Lopressor. Treated w/ unasyn for UTI. Acute hypoxic respiratory failure 2/2 COPD exacerbation resolved prior to discharge.     New medications: Lopressor, Eliquis  Labs to be followed outpatient: None  Exam to be followed outpatient: None   #Discharge: do not delete    Patient is 85 yo M with past medical history of dementia (A&Ox 1 at baseline, daniels of [facility]), HTN, BPH with chronic suprapubic catheter     Presented with SOB and ?AMS, found to have acute hypoxic respiratory failure with ED exam notable for wheezes, AFib with RVR and sepsis likely 2/2 UTI    Patient is daniels of [facility], legal guardian is Kenya Feliz ([phone]). If unable to reach, general line is [phone]    Problem List/Main Diagnoses (system-based):   #Urinary tract infection associated with cystostomy catheter, initial encounter  In setting of indwelling suprapubic catheter, suprapubic tenderness, positive UA.  Urine cultures + psuedomonas – resistant to fluoroquinolones.   - received 7 day course of zosyn (4/6) in house to complete treatment for Pseudomonas UTI    #COPD exacerbation  Acute hypoxic respiratory failure 2/2 COPD exacerbation  - resolved, stable on room air  - c/w home medicatiosn      #New onset a-fib  No prior history of afib but was found to have afib w/ RVR likely in setting of sepsis. CHADVASC of at least 3 placing him at high thromboembolic risk.   - c/w eliquis 5 BID   - c/w metoprolol 50 BID.     #Dementia without behavioral disturbance, unspecified dementia type  A&Ox1 for duration of hospital stay. Frequent re-orientation, assist w/ ADLs.    #Benign prostatic hyperplasia with urinary retention  BPH w/ chronic suprapubic catheter (catheter placed 1 month ago per [facility]). Takes flomax at home.   - catheter changed overnight on 3/31  - stop flomax given suprapubic catheter - per urology once patient has chronic indwelling estrada or suprapubic catheter.     #SIENNA (acute kidney injury)  Patient with SIENNA. Likely pre-renal given poor PO intake. Also may be secondary to medication. Improved with fluids      Inpatient treatment course:   Admitted for management of sepsis 2/2 pseudomonal UTI and COPD exacerbation. Found to have new onset afib – started eliquis and Lopressor. Treated w/ zosyn for UTI. Acute hypoxic respiratory failure 2/2 COPD exacerbation resolved with neb treatments prior to discharge.     New medications: Lopressor, Eliquis  Labs to be followed outpatient: recheck creatinine within one week after discharge  Exam to be followed outpatient: None    ATTENDING ATTESTATION  Date of Service: 4/6/21  I interviewed and examined Dima Vicente on the day of discharge and greater than 30 minutes were spent by the team on processing their hospital discharge and coordinating their post-hospital care.   I discussed the care plan with the resident team. I agree with the discharge plan as outlined in the Discharge Summary. I have personally reviewed the above discharge summary and made changes where necessary, and as noted below.  86YOM with history of dementia (baseline AAOx1), essential HTN, BPH s/p suprapubic catheter admitted for acute hypoxemic respiratory failure 2/2 COPD exacerbation and severe sepsis with lactic acidosis and acute metabolic encephalopathy 2/2 UTI. Also with suspected new onset afib with RVR. Improving with abx and stepped down to RMF 3/31. Urine cultures growing Pseudomonas, resistant to fluoroquinolones. Finished 7 days total of IV zosyn for Pseudomonas UTI. At end of hospitalization patient with slight SIENNA creatinine 1.6, suspected 2/2 poor PO intake as well as possibly related to zosyn. Creatinine pleateaued and improving and stable for discharge today - recheck creatinine in one week.  Physical exam on day of discharge:  General: pleasant, appropriate, no acute distress. Answering questions mostly by nodding head yes/no but able to converse when prompted to  HEENT: NC/AT, MMM  Neck: soft, supple, no lymphadenopathy  Cardiac: irregularly irregular rhythm, normal rate, normal s1/s2, no murmurs, rubs, or gallops  Lungs: clear to auscultation bilaterally without wheezes, rales, or rhonchi. Normal work of breathing. Speaking in complete sentences.  Abdomen: soft, nontender, nondistended. Bowel sounds present and normoactive.   : suprapubic catheter in place clear yellow urine  Extremities: moving all extremities. No edema.  Neuro: awake, alert, oriented x1. Follows commands. Moving all extremities. Sensation intact.  Psych: no evidence of AVH.  Aníbal Orta MD  Attending Hospitalist   #Discharge: do not delete    Patient is 87 yo M with past medical history of dementia (A&Ox 1 at baseline, daniels of [facility]), HTN, BPH with chronic suprapubic catheter     Presented with SOB and ?AMS, found to have acute hypoxic respiratory failure with ED exam notable for wheezes, AFib with RVR and sepsis likely 2/2 UTI    Patient is daniels of [facility], legal guardian is Kenya eFliz ([phone]). If unable to reach, general line is [phone]    Problem List/Main Diagnoses (system-based):   #Urinary tract infection associated with cystostomy catheter, initial encounter  In setting of indwelling suprapubic catheter, suprapubic tenderness, positive UA.  Urine cultures + psuedomonas – resistant to fluoroquinolones.   - received 7 day course of zosyn (4/6) in house to complete treatment for Pseudomonas UTI    #COPD exacerbation  Acute hypoxic respiratory failure 2/2 COPD exacerbation  - resolved, stable on room air  - c/w home medicatiosn      #New onset a-fib  No prior history of afib but was found to have afib w/ RVR likely in setting of sepsis. CHADVASC of at least 3 placing him at high thromboembolic risk.   - c/w eliquis 5 BID   - c/w metoprolol 50 BID.     #Dementia without behavioral disturbance, unspecified dementia type  A&Ox1 for duration of hospital stay. Frequent re-orientation, assist w/ ADLs.    #Benign prostatic hyperplasia with urinary retention  BPH w/ chronic suprapubic catheter (catheter placed 1 month ago per [facility]). Takes flomax at home.   - catheter changed overnight on 3/31  - stop flomax given suprapubic catheter - per urology once patient has chronic indwelling estrada or suprapubic catheter.     #SIENNA (acute kidney injury)  Patient with SIENNA. Likely pre-renal given poor PO intake. Also may be secondary to medication. Improved with fluids      Inpatient treatment course:   Admitted for management of sepsis 2/2 pseudomonal UTI and COPD exacerbation. Found to have new onset afib – started eliquis and Lopressor. Treated w/ zosyn for UTI. Acute hypoxic respiratory failure 2/2 COPD exacerbation resolved with neb treatments prior to discharge.     New medications: Lopressor, Eliquis  Labs to be followed outpatient: recheck creatinine within one week after discharge  Exam to be followed outpatient: None    ATTENDING ATTESTATION  Date of Service: 4/7/21  I interviewed and examined Dima Vicente on the day of discharge and greater than 30 minutes were spent by the team on processing their hospital discharge and coordinating their post-hospital care.   I discussed the care plan with the resident team. I agree with the discharge plan as outlined in the Discharge Summary. I have personally reviewed the above discharge summary and made changes where necessary, and as noted below.  86YOM with history of dementia (baseline AAOx1), essential HTN, BPH s/p suprapubic catheter admitted for acute hypoxemic respiratory failure 2/2 COPD exacerbation and severe sepsis with lactic acidosis and acute metabolic encephalopathy 2/2 UTI. Also with suspected new onset afib with RVR. Improving with abx and stepped down to RMF 3/31. Urine cultures growing Pseudomonas, resistant to fluoroquinolones. Finished 7 days total of IV zosyn for Pseudomonas UTI. At end of hospitalization patient with slight SIENNA creatinine 1.6, suspected 2/2 poor PO intake as well as possibly related to zosyn. Creatinine pleateaued and improving and stable for discharge today - recheck creatinine in one week.  Physical exam on day of discharge:  General: pleasant, appropriate, no acute distress. Answering questions mostly by nodding head yes/no but able to converse when prompted to  HEENT: NC/AT, MMM  Neck: soft, supple, no lymphadenopathy  Cardiac: irregularly irregular rhythm, normal rate, normal s1/s2, no murmurs, rubs, or gallops  Lungs: clear to auscultation bilaterally without wheezes, rales, or rhonchi. Normal work of breathing. Speaking in complete sentences.  Abdomen: soft, nontender, nondistended. Bowel sounds present and normoactive.   : suprapubic catheter in place clear yellow urine  Extremities: moving all extremities. No edema.  Neuro: awake, alert, oriented x1. Follows commands. Moving all extremities. Sensation intact.  Psych: no evidence of AVH.  Aníbal Orta MD  Attending Hospitalist

## 2021-04-05 NOTE — PROGRESS NOTE ADULT - ATTENDING COMMENTS
Initial attending contact date  4/5/21    . See fellow note written above for details. I reviewed the fellow documentation. I have personally seen and examined this patient. I reviewed vitals, labs, medications, cardiac studies, and additional imaging. I agree with the above fellow's findings and plans as written above with the following additions/statements.      86M w/ dementia, HTN, chronic SPC c/b Sepsis d/t UTI. Cardiology consulted for new onset A fib    -new onset A fib in setting of sepsis  -currently rate controlled on metoprolol tartrate 50 mg bid, can transition to toprol  mg poqd upon dc  - Cont with eliquis 5 mg  bid for now, pending Crt in am. If continued increase in Crt, will need to decrease eliquis 2.5 mg bid   -Will continue to follow

## 2021-04-05 NOTE — PROGRESS NOTE ADULT - PROBLEM SELECTOR PLAN 3
d/t COPD exacerbation; resolved, stable on room air In setting of indwelling suprapubic catheter, suprapubic tenderness, positive UA. Now afebrile after Ceftriaxone started.   - zosyn (- 4/6) given history of indwelling estrada for 5 day course  - urine cultures + psuedomonas - sensitivites have resulted  - follow blood cultures - NGTD

## 2021-04-05 NOTE — PROGRESS NOTE ADULT - PROBLEM SELECTOR PLAN 4
in setting of sepsis; cont. beta-blocker, A/C w/ DOAC; TSH WNL, TTE mostly unremarkable; monitor serial EKGs; appreciate Cardiology recs Acute hypoxic respiratory failure 2/2 COPD exacerbation  - resolved, stable on room air  - c/w duonebs PRN

## 2021-04-05 NOTE — DISCHARGE NOTE PROVIDER - NSDCCPCAREPLAN_GEN_ALL_CORE_FT
PRINCIPAL DISCHARGE DIAGNOSIS  Diagnosis: UTI (urinary tract infection), bacterial  Assessment and Plan of Treatment:       SECONDARY DISCHARGE DIAGNOSES  Diagnosis: Atrial fibrillation  Assessment and Plan of Treatment:     Diagnosis: COPD exacerbation  Assessment and Plan of Treatment:      PRINCIPAL DISCHARGE DIAGNOSIS  Diagnosis: UTI (urinary tract infection), bacterial  Assessment and Plan of Treatment: An infection in any part of the urinary system, the kidneys, bladder, or urethra. They usually occur in the bladder or urethra, but more serious infections involve the kidney. A bladder infection may cause pelvic pain, increased urge to urinate, pain with urination, and blood in the urine. A kidney infection may cause back pain, nausea, vomiting, and fever.  Common treatment is with antibiotics.  - You were treated with antibiotics for your urinary tract infection  - Please follow up with your urologist        SECONDARY DISCHARGE DIAGNOSES  Diagnosis: Atrial fibrillation  Assessment and Plan of Treatment: An irregular, often rapid heart rate that commonly causes poor blood flow. The heart's upper chambers (atria) beat out of coordination with the lower chambers (ventricles). This condition may have no symptoms, but when symptoms do appear they include palpitations, shortness of breath, and fatigue. Treatments include drugs, electrical shock (cardioversion), and minimally invasive surgery (ablation).  - You were found to have atrial fibrillation and were started on two new medications: lopressor (to control your heart rate) and eliquis (a blood thinner to prevent the formation of clots)    Diagnosis: COPD exacerbation  Assessment and Plan of Treatment: A COPD exacerbation, or flare-up, occurs when your COPD respiratory symptoms become much more severe. While everyone experiences exacerbations differently, there are a number of possible warning signs — and you may feel as if you can't catch your breath.  - You had low oxygen levels becuase of a COPD exacerbation  - Your oxygen levels improved with treatment

## 2021-04-05 NOTE — PROGRESS NOTE ADULT - PROBLEM SELECTOR PROBLEM 1
Urinary tract infection associated with cystostomy catheter, initial encounter SIENNA (acute kidney injury)

## 2021-04-05 NOTE — PROGRESS NOTE ADULT - PROBLEM SELECTOR PLAN 5
cont. frequent re-orientation, assist w/ ADLs No prior history of afib but was found to have afib w/ RVR likely in setting of sepsis.   - c/w eliquis 5 BID   - c/w metoprolol 50 BID.

## 2021-04-05 NOTE — PROGRESS NOTE ADULT - PROBLEM SELECTOR PROBLEM 3
Acute respiratory failure with hypoxia Urinary tract infection associated with cystostomy catheter, initial encounter

## 2021-04-05 NOTE — PROGRESS NOTE ADULT - SUBJECTIVE AND OBJECTIVE BOX
Patient seen and examined at bedside, appears alert but does not respond. Unable to participate in ROS    PAST MEDICAL & SURGICAL HISTORY:  --  Home Medications:  amLODIPine 10 mg oral tablet: 1 tab(s) orally once a day (31 Mar 2021 14:00)  aspirin 81 mg oral tablet, chewable: 1 tab(s) orally once a day (31 Mar 2021 14:00)  atorvastatin 40 mg oral tablet: 1 tab(s) orally once a day (31 Mar 2021 14:00)  Colace 100 mg oral capsule: 1 cap(s) orally 2 times a day (31 Mar 2021 14:00)  Linzess 72 mcg oral capsule: 1 cap(s) orally once a day (31 Mar 2021 14:00)  omeprazole 20 mg oral delayed release capsule: 1 cap(s) orally once a day (31 Mar 2021 14:00)  tamsulosin 0.4 mg oral capsule: 1 cap(s) orally once a day (31 Mar 2021 14:00)    MEDICATIONS  (STANDING):  albuterol/ipratropium for Nebulization 3 milliLiter(s) Nebulizer every 6 hours  apixaban 5 milliGRAM(s) Oral every 12 hours  aspirin enteric coated 81 milliGRAM(s) Oral daily  atorvastatin 40 milliGRAM(s) Oral at bedtime  metoprolol tartrate 50 milliGRAM(s) Oral every 12 hours  pantoprazole    Tablet 40 milliGRAM(s) Oral before breakfast  piperacillin/tazobactam IVPB.. 4.5 Gram(s) IV Intermittent every 6 hours  senna 2 Tablet(s) Oral at bedtime    MEDICATIONS  (PRN):    .  VITAL SIGNS:  T(C): 36.8 (04-05-21 @ 16:05), Max: 36.8 (04-05-21 @ 16:05)  T(F): 98.2 (04-05-21 @ 16:05), Max: 98.2 (04-05-21 @ 16:05)  HR: 62 (04-05-21 @ 16:05) (62 - 102)  BP: 114/62 (04-05-21 @ 16:05) (114/62 - 122/85)  BP(mean): --  RR: 18 (04-05-21 @ 16:05) (18 - 18)  SpO2: 94% (04-05-21 @ 16:05) (92% - 94%)  Wt(kg): --    PHYSICAL EXAM:    Constitutional: WDWN resting comfortably in bed; NAD  Head: NC/AT  ENT:DRY MM  Neck: supple; no JVD   Respiratory: CTA B/L; no W/R/R,   Cardiac: +S1/S2; Irregular RR;   Gastrointestinal: soft, NT/ND; no rebound or guarding; +BS  Extremities: WWP, no clubbing or cyanosis; no peripheral edema  Vascular: 2+ radial,  DP/PT pulses B/L          .  LABS:                         16.7   9.01  )-----------( 251      ( 05 Apr 2021 06:23 )             52.5     04-05    146<H>  |  114<H>  |  21  ----------------------------<  138<H>  4.1   |  21<L>  |  1.65<H>    Ca    9.0      05 Apr 2021 06:23  Phos  3.4     04-04  Mg     2.2     04-05    TPro  6.7  /  Alb  2.7<L>  /  TBili  0.9  /  DBili  x   /  AST  39  /  ALT  64<H>  /  AlkPhos  86  04-04    PT/INR - ( 05 Apr 2021 06:23 )   PT: 17.6 sec;   INR: 1.50          PTT - ( 05 Apr 2021 06:23 )  PTT:30.7 sec      RADIOLOGY, EKG & ADDITIONAL TESTS: Reviewed.       < from: TTE Echo Complete w/o Contrast w/ Doppler (04.01.21 @ 13:11) >   Patient was tachycardic during the study.   2. 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 The peak transvalvular velocity is 1.54 m/s, the mean transvalvular gradient is 5.90 mmHg, and the LVOT/AV velocity ratio is 0.63. The peak transaortic gradient is 9.49 mmHg. There is trace aortic regurgitation.   3. There is mild tricuspid regurgitation. Pulmonary artery systolic pressure (estimated using the tricuspid regurgitant gradient and an estimate of right atrial pressure) is 35 mmHg.   4. The right ventricle is normal in size. Right ventricular systolic function is probably normal.   5. Left ventricular hypertrophy present. The left ventricle is normal in size and systolic function with a calculated ejection fraction of 65-70%.   6. Small pericardial effusion without echocardiographic evidence of cardiac tamponade physiology.    < end of copied text >

## 2021-04-05 NOTE — DISCHARGE NOTE PROVIDER - NSDCMRMEDTOKEN_GEN_ALL_CORE_FT
amLODIPine 10 mg oral tablet: 1 tab(s) orally once a day  aspirin 81 mg oral tablet, chewable: 1 tab(s) orally once a day  atorvastatin 40 mg oral tablet: 1 tab(s) orally once a day  Colace 100 mg oral capsule: 1 cap(s) orally 2 times a day  Eliquis 5 mg oral tablet: 1 tab(s) orally 2 times a day   Linzess 72 mcg oral capsule: 1 cap(s) orally once a day  omeprazole 20 mg oral delayed release capsule: 1 cap(s) orally once a day  tamsulosin 0.4 mg oral capsule: 1 cap(s) orally once a day   amLODIPine 10 mg oral tablet: 1 tab(s) orally once a day  aspirin 81 mg oral tablet, chewable: 1 tab(s) orally once a day  atorvastatin 40 mg oral tablet: 1 tab(s) orally once a day  Colace 100 mg oral capsule: 1 cap(s) orally 2 times a day  Eliquis 5 mg oral tablet: 1 tab(s) orally 2 times a day   Linzess 72 mcg oral capsule: 1 cap(s) orally once a day  omeprazole 20 mg oral delayed release capsule: 1 cap(s) orally once a day   amLODIPine 10 mg oral tablet: 1 tab(s) orally once a day  aspirin 81 mg oral tablet, chewable: 1 tab(s) orally once a day  atorvastatin 40 mg oral tablet: 1 tab(s) orally once a day  Colace 100 mg oral capsule: 1 cap(s) orally 2 times a day  Eliquis 5 mg oral tablet: 1 tab(s) orally 2 times a day   Linzess 72 mcg oral capsule: 1 cap(s) orally once a day  metoprolol tartrate 50 mg oral tablet: 1 tab(s) orally every 12 hours  omeprazole 20 mg oral delayed release capsule: 1 cap(s) orally once a day

## 2021-04-05 NOTE — DISCHARGE NOTE PROVIDER - NSDCFUADDAPPT_GEN_ALL_CORE_FT
Receives care from [facility] (Dr. Helder Suárez and Dr. Chino Gil): These doctors do home visits and has the patient on the schedule for a visit the week of 4/12 as their schedule permits. Will call patient at home to schedule a time. Discharge paperwork to be faxed to [facility] ([phone])

## 2021-04-05 NOTE — PROGRESS NOTE ADULT - ATTENDING COMMENTS
Patient discussed with resident team and plan of care reviewed. I have personally reviewed all pertinent labs and imaging and performed an independent history and physical. Resident note personally reviewed, and I agree with above resident note with the following additions:    86YOM with history of dementia (baseline AAOx1), essential HTN, BPH s/p suprapubic catheter admitted for acute hypoxemic respiratory failure 2/2 COPD exacerbation and severe sepsis with lactic acidosis and acute metabolic encephalopathy 2/2 UTI. Also with suspected new onset afib with RVR. Improving with abx and stepped down to RMF 3/31. Urine cultures growing Pseudomonas, resistant to fluoroquinolones.    Clinically doing well though poor PO intake, and SIENNA/mild hypernatremia noted today. Will give IVF and monitor. Will finish abx for Pseudomonas UTI tomorrow, possibly for discharge afterwards pending his renal function.

## 2021-04-05 NOTE — DISCHARGE NOTE PROVIDER - PROVIDER TOKENS
FREE:[LAST:[Jeffery],FIRST:[Chino],PHONE:[phone]:[(   )    -],ADDRESS:[[facility]  General Surgery  [address]  [place] [address]],FOLLOWUP:[2 weeks]] FREE:[LAST:[Jeffery],FIRST:[Chino],PHONE:[phone]],FAX:[(   )    -],ADDRESS:[[facility]  General Surgery  [address]  [place] [address]],FOLLOWUP:[2 weeks],ESTABLISHEDPATIENT:[T]],FREE:[LAST:[Elif],FIRST:[Nuzhat],PHONE:[phone]],FAX:[(   )    -],ADDRESS:[[facility] Re[facility] Pel[facility] Specialist  [address] [place] [address]],FOLLOWUP:[2 weeks],ESTABLISHEDPATIENT:[T]] FREE:[LAST:[Jeffery],FIRST:[Chino],PHONE:[phone]],FAX:[(   )    -],ADDRESS:[[facility]  General Surgery  [address]  [place] [address]],FOLLOWUP:[2 weeks],ESTABLISHEDPATIENT:[T]],FREE:[LAST:[Elif],FIRST:[Nuzhat],PHONE:[phone]],FAX:[(   )    -],ADDRESS:[Urogynecology & Reconstructive Pelvic Surgery Specialist  [address] [place] [address]],FOLLOWUP:[2 weeks],ESTABLISHEDPATIENT:[T]],FREE:[LAST:[Kamini],FIRST:[Jacobo],PHONE:[phone]],FAX:[(   )    -],ADDRESS:[Pulmonologist   [address]  [place] [address]],FOLLOWUP:[2 weeks],ESTABLISHEDPATIENT:[T]]

## 2021-04-05 NOTE — PROGRESS NOTE ADULT - PROBLEM SELECTOR PLAN 1
d/t Pseudomonas; cont. Zosyn; WBC normalized Patient with SIENNA. Likely pre-renal given poor PO intake. Also may be secondary to medication.   - 4/5 AM, 1L fluid bolus  - trend Cr  - avoid nephrotoxins

## 2021-04-05 NOTE — PROGRESS NOTE ADULT - PROBLEM SELECTOR PROBLEM 7
Hypokalemia Benign prostatic hyperplasia with urinary retention Advancement Flap (Single) Text: The defect edges were debeveled with a #15 scalpel blade.  Given the location of the defect and the proximity to free margins a single advancement flap was deemed most appropriate.  Using a sterile surgical marker, an appropriate advancement flap was drawn incorporating the defect and placing the expected incisions within the relaxed skin tension lines where possible.    The area thus outlined was incised deep to adipose tissue with a #15 scalpel blade.  The skin margins were undermined to an appropriate distance in all directions utilizing iris scissors.

## 2021-04-05 NOTE — DISCHARGE NOTE PROVIDER - CARE PROVIDER_API CALL
Chino Gil  [facility]  General Surgery  [address]  [place] [address]  Phone: [phone]  Fax: (   )    -  Follow Up Time: 2 weeks   Chino Gil  [facility]  General Surgery  [address]  [place] [address]  Phone: [phone]  Fax: (   )    -  Established Patient  Follow Up Time: 2 weeks    Nuzhat Asencio  Urogynecology & Reconstructive Pelvic Surgery Specialist  [address] [place] [address]  Phone: [phone]  Fax: (   )    -  Established Patient  Follow Up Time: 2 weeks   Chino Gil  [facility]  General Surgery  [address]  [place] [address]  Phone: [phone]  Fax: (   )    -  Established Patient  Follow Up Time: 2 weeks    Nuzhat Asencio  Urogynecology & Reconstructive Pelvic Surgery Specialist  [address] [place] [address]  Phone: [phone]  Fax: (   )    -  Established Patient  Follow Up Time: 2 weeks    Jacobo Suárez  Pulmonologist   [address]  [place] [address]  Phone: [phone]  Fax: (   )    -  Established Patient  Follow Up Time: 2 weeks

## 2021-04-05 NOTE — PROGRESS NOTE ADULT - PROBLEM SELECTOR PLAN 7
repleted BPH w/ chronic suprapubic catheter (catheter placed 1 month ago per A). Takes flomax at home.  - catheter changed overnight on 3/31  - holding flomax given suprapubic catheter - per urology once patient has chronic indwelling estrada or suprapubic catheter

## 2021-04-05 NOTE — PROGRESS NOTE ADULT - ASSESSMENT
86M w/ dementia, HTN, chronic SPC c/b Sepsis d/t UTI, Found to be in Afib, presumed new, for which Cardiology has been following    1) Atrial Fibrillation, rate Controlled  -Patient rates have remains <110 overall on Lopressor 50 mg PO BID. Anticipate with treatment of underlying Sepsis, rate will improve. SHould patient remain tachycardic, BP allowing, while inpatient can increase lopressor to 50 mg po Q/8 with holding parameters  -Patient with CHADVASC of at least 3 placing him at high thromboembolic risk. Recommend continuing ELiquis 5 mg PO BID. However given increase in Cr today and SIENNA on labs, recommend close monitoring of renal function with low treshold to half Eliquis dose  -Please obtain daily 12 lead EKG  -Please keep K>4 and Mg>2  -Cardiology will continue to follow please call with any questions       -

## 2021-04-06 LAB
ANION GAP SERPL CALC-SCNC: 10 MMOL/L — SIGNIFICANT CHANGE UP (ref 5–17)
BASOPHILS # BLD AUTO: 0.05 K/UL — SIGNIFICANT CHANGE UP (ref 0–0.2)
BASOPHILS NFR BLD AUTO: 0.5 % — SIGNIFICANT CHANGE UP (ref 0–2)
BUN SERPL-MCNC: 18 MG/DL — SIGNIFICANT CHANGE UP (ref 7–23)
CALCIUM SERPL-MCNC: 8.8 MG/DL — SIGNIFICANT CHANGE UP (ref 8.4–10.5)
CHLORIDE SERPL-SCNC: 113 MMOL/L — HIGH (ref 96–108)
CO2 SERPL-SCNC: 22 MMOL/L — SIGNIFICANT CHANGE UP (ref 22–31)
CREAT SERPL-MCNC: 1.6 MG/DL — HIGH (ref 0.5–1.3)
EOSINOPHIL # BLD AUTO: 0.17 K/UL — SIGNIFICANT CHANGE UP (ref 0–0.5)
EOSINOPHIL NFR BLD AUTO: 1.8 % — SIGNIFICANT CHANGE UP (ref 0–6)
GLUCOSE SERPL-MCNC: 216 MG/DL — HIGH (ref 70–99)
HCT VFR BLD CALC: 48.2 % — SIGNIFICANT CHANGE UP (ref 39–50)
HGB BLD-MCNC: 15 G/DL — SIGNIFICANT CHANGE UP (ref 13–17)
IMM GRANULOCYTES NFR BLD AUTO: 0.5 % — SIGNIFICANT CHANGE UP (ref 0–1.5)
LYMPHOCYTES # BLD AUTO: 1.28 K/UL — SIGNIFICANT CHANGE UP (ref 1–3.3)
LYMPHOCYTES # BLD AUTO: 13.5 % — SIGNIFICANT CHANGE UP (ref 13–44)
MAGNESIUM SERPL-MCNC: 2.1 MG/DL — SIGNIFICANT CHANGE UP (ref 1.6–2.6)
MCHC RBC-ENTMCNC: 28.8 PG — SIGNIFICANT CHANGE UP (ref 27–34)
MCHC RBC-ENTMCNC: 31.1 GM/DL — LOW (ref 32–36)
MCV RBC AUTO: 92.7 FL — SIGNIFICANT CHANGE UP (ref 80–100)
MONOCYTES # BLD AUTO: 0.43 K/UL — SIGNIFICANT CHANGE UP (ref 0–0.9)
MONOCYTES NFR BLD AUTO: 4.5 % — SIGNIFICANT CHANGE UP (ref 2–14)
NEUTROPHILS # BLD AUTO: 7.49 K/UL — HIGH (ref 1.8–7.4)
NEUTROPHILS NFR BLD AUTO: 79.2 % — HIGH (ref 43–77)
NRBC # BLD: 0 /100 WBCS — SIGNIFICANT CHANGE UP (ref 0–0)
PHOSPHATE SERPL-MCNC: 2.9 MG/DL — SIGNIFICANT CHANGE UP (ref 2.5–4.5)
PLATELET # BLD AUTO: 228 K/UL — SIGNIFICANT CHANGE UP (ref 150–400)
POTASSIUM SERPL-MCNC: 4.1 MMOL/L — SIGNIFICANT CHANGE UP (ref 3.5–5.3)
POTASSIUM SERPL-SCNC: 4.1 MMOL/L — SIGNIFICANT CHANGE UP (ref 3.5–5.3)
RBC # BLD: 5.2 M/UL — SIGNIFICANT CHANGE UP (ref 4.2–5.8)
RBC # FLD: 14.3 % — SIGNIFICANT CHANGE UP (ref 10.3–14.5)
SODIUM SERPL-SCNC: 145 MMOL/L — SIGNIFICANT CHANGE UP (ref 135–145)
WBC # BLD: 9.47 K/UL — SIGNIFICANT CHANGE UP (ref 3.8–10.5)
WBC # FLD AUTO: 9.47 K/UL — SIGNIFICANT CHANGE UP (ref 3.8–10.5)

## 2021-04-06 PROCEDURE — 99233 SBSQ HOSP IP/OBS HIGH 50: CPT

## 2021-04-06 RX ORDER — METOPROLOL TARTRATE 50 MG
1 TABLET ORAL
Qty: 60 | Refills: 2
Start: 2021-04-06 | End: 2021-07-04

## 2021-04-06 RX ORDER — LINACLOTIDE 145 UG/1
1 CAPSULE, GELATIN COATED ORAL
Qty: 0 | Refills: 0 | DISCHARGE

## 2021-04-06 RX ORDER — ATORVASTATIN CALCIUM 80 MG/1
1 TABLET, FILM COATED ORAL
Qty: 0 | Refills: 0 | DISCHARGE

## 2021-04-06 RX ORDER — TAMSULOSIN HYDROCHLORIDE 0.4 MG/1
1 CAPSULE ORAL
Qty: 0 | Refills: 0 | DISCHARGE

## 2021-04-06 RX ORDER — ASPIRIN/CALCIUM CARB/MAGNESIUM 324 MG
1 TABLET ORAL
Qty: 0 | Refills: 0 | DISCHARGE

## 2021-04-06 RX ORDER — AMLODIPINE BESYLATE 2.5 MG/1
1 TABLET ORAL
Qty: 0 | Refills: 0 | DISCHARGE

## 2021-04-06 RX ORDER — OMEPRAZOLE 10 MG/1
1 CAPSULE, DELAYED RELEASE ORAL
Qty: 0 | Refills: 0 | DISCHARGE

## 2021-04-06 RX ORDER — DOCUSATE SODIUM 100 MG
1 CAPSULE ORAL
Qty: 0 | Refills: 0 | DISCHARGE

## 2021-04-06 RX ORDER — APIXABAN 2.5 MG/1
1 TABLET, FILM COATED ORAL
Qty: 60 | Refills: 2
Start: 2021-04-06 | End: 2021-07-04

## 2021-04-06 RX ADMIN — SENNA PLUS 2 TABLET(S): 8.6 TABLET ORAL at 22:41

## 2021-04-06 RX ADMIN — Medication 3 MILLILITER(S): at 12:30

## 2021-04-06 RX ADMIN — ATORVASTATIN CALCIUM 40 MILLIGRAM(S): 80 TABLET, FILM COATED ORAL at 22:41

## 2021-04-06 RX ADMIN — APIXABAN 5 MILLIGRAM(S): 2.5 TABLET, FILM COATED ORAL at 18:31

## 2021-04-06 RX ADMIN — Medication 3 MILLILITER(S): at 05:20

## 2021-04-06 RX ADMIN — Medication 3 MILLILITER(S): at 18:31

## 2021-04-06 RX ADMIN — PIPERACILLIN AND TAZOBACTAM 200 GRAM(S): 4; .5 INJECTION, POWDER, LYOPHILIZED, FOR SOLUTION INTRAVENOUS at 12:30

## 2021-04-06 RX ADMIN — PANTOPRAZOLE SODIUM 40 MILLIGRAM(S): 20 TABLET, DELAYED RELEASE ORAL at 05:20

## 2021-04-06 RX ADMIN — PIPERACILLIN AND TAZOBACTAM 200 GRAM(S): 4; .5 INJECTION, POWDER, LYOPHILIZED, FOR SOLUTION INTRAVENOUS at 18:31

## 2021-04-06 RX ADMIN — Medication 3 MILLILITER(S): at 23:56

## 2021-04-06 RX ADMIN — Medication 50 MILLIGRAM(S): at 18:31

## 2021-04-06 RX ADMIN — PIPERACILLIN AND TAZOBACTAM 200 GRAM(S): 4; .5 INJECTION, POWDER, LYOPHILIZED, FOR SOLUTION INTRAVENOUS at 05:20

## 2021-04-06 RX ADMIN — Medication 50 MILLIGRAM(S): at 06:16

## 2021-04-06 RX ADMIN — APIXABAN 5 MILLIGRAM(S): 2.5 TABLET, FILM COATED ORAL at 05:20

## 2021-04-06 RX ADMIN — Medication 81 MILLIGRAM(S): at 12:30

## 2021-04-06 RX ADMIN — PIPERACILLIN AND TAZOBACTAM 200 GRAM(S): 4; .5 INJECTION, POWDER, LYOPHILIZED, FOR SOLUTION INTRAVENOUS at 23:48

## 2021-04-06 NOTE — PROGRESS NOTE ADULT - SUBJECTIVE AND OBJECTIVE BOX
Patient seen and examined at bedside, appears alert but does not respond. Unable to participate in ROS    PAST MEDICAL & SURGICAL HISTORY:  --  Home Medications:  amLODIPine 10 mg oral tablet: 1 tab(s) orally once a day (31 Mar 2021 14:00)  aspirin 81 mg oral tablet, chewable: 1 tab(s) orally once a day (31 Mar 2021 14:00)  atorvastatin 40 mg oral tablet: 1 tab(s) orally once a day (31 Mar 2021 14:00)  Colace 100 mg oral capsule: 1 cap(s) orally 2 times a day (31 Mar 2021 14:00)  Linzess 72 mcg oral capsule: 1 cap(s) orally once a day (31 Mar 2021 14:00)  omeprazole 20 mg oral delayed release capsule: 1 cap(s) orally once a day (31 Mar 2021 14:00)  tamsulosin 0.4 mg oral capsule: 1 cap(s) orally once a day (31 Mar 2021 14:00)    MEDICATIONS  (STANDING):  albuterol/ipratropium for Nebulization 3 milliLiter(s) Nebulizer every 6 hours  apixaban 5 milliGRAM(s) Oral every 12 hours  aspirin enteric coated 81 milliGRAM(s) Oral daily  atorvastatin 40 milliGRAM(s) Oral at bedtime  metoprolol tartrate 50 milliGRAM(s) Oral every 12 hours  pantoprazole    Tablet 40 milliGRAM(s) Oral before breakfast  piperacillin/tazobactam IVPB.. 4.5 Gram(s) IV Intermittent every 6 hours  senna 2 Tablet(s) Oral at bedtime    MEDICATIONS  (PRN):    .  ICU Vital Signs Last 24 Hrs  T(C): 36.9 (06 Apr 2021 16:25), Max: 37.1 (05 Apr 2021 20:43)  T(F): 98.5 (06 Apr 2021 16:25), Max: 98.8 (05 Apr 2021 20:43)  HR: 66 (06 Apr 2021 16:25) (65 - 77)  BP: 137/76 (06 Apr 2021 16:25) (118/74 - 137/76)  BP(mean): --  ABP: --  ABP(mean): --  RR: 18 (06 Apr 2021 16:25) (18 - 18)  SpO2: 95% (06 Apr 2021 16:25) (94% - 95%)    PHYSICAL EXAM:    Constitutional: WDWN resting comfortably in bed; NAD  Head: NC/AT  ENT:DRY MM  Neck: supple; no JVD   Respiratory: CTA B/L; no W/R/R,   Cardiac: +S1/S2; Irregular RR;   Gastrointestinal: soft, NT/ND; no rebound or guarding; +BS  Extremities: WWP, no clubbing or cyanosis; no peripheral edema  Vascular: 2+ radial,  DP/PT pulses B/L    .  LABS:                         15.0   9.47  )-----------( 228      ( 06 Apr 2021 10:28 )             48.2     04-06    145  |  113<H>  |  18  ----------------------------<  216<H>  4.1   |  22  |  1.60<H>    Ca    8.8      06 Apr 2021 10:28  Phos  2.9     04-06  Mg     2.1     04-06      PT/INR - ( 05 Apr 2021 06:23 )   PT: 17.6 sec;   INR: 1.50          PTT - ( 05 Apr 2021 06:23 )  PTT:30.7 sec              RADIOLOGY, EKG & ADDITIONAL TESTS: Reviewed.     RADIOLOGY, EKG & ADDITIONAL TESTS: Reviewed.       < from: TTE Echo Complete w/o Contrast w/ Doppler (04.01.21 @ 13:11) >   Patient was tachycardic during the study.   2. 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 The peak transvalvular velocity is 1.54 m/s, the mean transvalvular gradient is 5.90 mmHg, and the LVOT/AV velocity ratio is 0.63. The peak transaortic gradient is 9.49 mmHg. There is trace aortic regurgitation.   3. There is mild tricuspid regurgitation. Pulmonary artery systolic pressure (estimated using the tricuspid regurgitant gradient and an estimate of right atrial pressure) is 35 mmHg.   4. The right ventricle is normal in size. Right ventricular systolic function is probably normal.   5. Left ventricular hypertrophy present. The left ventricle is normal in size and systolic function with a calculated ejection fraction of 65-70%.   6. Small pericardial effusion without echocardiographic evidence of cardiac tamponade physiology.    < end of copied text >

## 2021-04-06 NOTE — PROGRESS NOTE ADULT - ATTENDING COMMENTS
Initial attending contact date  4/5/21    . See fellow note written above for details. I reviewed the fellow documentation. I have personally seen and examined this patient. I reviewed vitals, labs, medications, cardiac studies, and additional imaging. I agree with the above fellow's findings and plans as written above with the following additions/statements.      86M w/ dementia, HTN, chronic SPC c/b Sepsis d/t UTI. Cardiology consulted for new onset A fib    -new onset A fib in setting of sepsis  -currently rate controlled on metoprolol tartrate 50 mg bid, can transition to toprol  mg poqd upon dc  -Given uptrending Crt,  decrease eliquis 2.5 mg bid   -Please reconsult as needed  -Can fu with Dr Wilson for outpatient cardiology : [phone]

## 2021-04-06 NOTE — PROGRESS NOTE ADULT - NSICDXPILOT_GEN_ALL_CORE
[place]

## 2021-04-06 NOTE — PROGRESS NOTE ADULT - PROBLEM SELECTOR PLAN 5
No prior history of afib but was found to have afib w/ RVR likely in setting of sepsis.   - c/w eliquis 5 BID   - c/w metoprolol 50 BID.

## 2021-04-06 NOTE — PROGRESS NOTE ADULT - SUBJECTIVE AND OBJECTIVE BOX
OVERNIGHT EVENTS: As per overnight staff, there were no acute events overnight    INTERVAL EVENTS:    SUBJECTIVE HPI: Patient seen and examined at bedside. no acute complaints. denies abdominal pain, chest pain, palpitations, other symptoms.    Vital Signs Last 24 Hrs  T(C): 36.7 (07 Apr 2021 08:54), Max: 36.9 (06 Apr 2021 16:25)  T(F): 98.1 (07 Apr 2021 08:54), Max: 98.5 (06 Apr 2021 16:25)  HR: 107 (07 Apr 2021 08:54) (66 - 115)  BP: 113/77 (07 Apr 2021 08:54) (113/77 - 137/76)  BP(mean): --  RR: 18 (07 Apr 2021 08:54) (18 - 18)  SpO2: 94% (07 Apr 2021 08:54) (93% - 95%)      PHYSICAL EXAM:  General: Comfortable, pleasant, well-nourished in NAD  Neurological: AAOx1 to self  HEENT: NC/AT; clear conjunctiva, no nasal or oropharyngeal discharge or exudates, MMM  Neck: supple,  Cardiovascular: +S1/S2, no murmurs/rubs/gallops, irregularly irregular, rate controlled  Respiratory: CTA B/L, no diminished breath sounds, no wheezes/rales/rhonchi, no increased work of breathing or accessory muscle use  Gastrointestinal: soft, NT/ND; active BSx4 quadrants  Genitourinary: suprapubic catheter in place with clean dressing over catheter, no CVA tenderness  Extremities: WWP; no edema, clubbing or cyanosis  Vascular: 2+ radial, DP/PT pulses B/L      MEDICATIONS:  MEDICATIONS  (STANDING):  albuterol/ipratropium for Nebulization 3 milliLiter(s) Nebulizer every 6 hours  apixaban 5 milliGRAM(s) Oral every 12 hours  aspirin enteric coated 81 milliGRAM(s) Oral daily  atorvastatin 40 milliGRAM(s) Oral at bedtime  metoprolol tartrate 50 milliGRAM(s) Oral every 12 hours  pantoprazole    Tablet 40 milliGRAM(s) Oral before breakfast  piperacillin/tazobactam IVPB.. 4.5 Gram(s) IV Intermittent every 6 hours  senna 2 Tablet(s) Oral at bedtime    MEDICATIONS  (PRN):      ALLERGIES/INTOLERANCES:  Allergies    Allergy Status Unknown    Intolerances        LABS:                04-06    145  |  113<H>  |  18  ----------------------------<  216<H>  4.1   |  22  |  1.60<H>    Ca    8.8      06 Apr 2021 10:28  Phos  2.9     04-06  Mg     2.1     04-06                            15.0   9.47  )-----------( 228      ( 06 Apr 2021 10:28 )             48.2     RADIOLOGY, EKG AND ADDITIONAL TESTS: Reviewed.

## 2021-04-06 NOTE — PROGRESS NOTE ADULT - PROBLEM SELECTOR PLAN 1
Patient with SIENNA. Likely pre-renal given poor PO intake. Also may be secondary to medication.   - 4/5 AM, 1L fluid bolus  - trend Cr  - avoid nephrotoxins

## 2021-04-06 NOTE — PROGRESS NOTE ADULT - ASSESSMENT
87yo M with PMH of Dementia (AAO x ?1 at baseline), HTN, BPH with chronic suprapubic catheter was BIBEMS with complaint of SOB and ?AMS, and was found to be in acute hypoxic respiratory failure with ED exam notable for wheezes, AFib with RVR and sepsis likely 2/2 UTI. appropriate

## 2021-04-06 NOTE — PROGRESS NOTE ADULT - PROBLEM SELECTOR PLAN 3
In setting of indwelling suprapubic catheter, suprapubic tenderness, positive UA. Now afebrile after Ceftriaxone started.   - urine cultures + psuedomonas - sensitivites with fluoroquinolone resistance so will finish 7 day of IV zosyn here  - follow blood cultures - NGTD

## 2021-04-06 NOTE — PROGRESS NOTE ADULT - PROVIDER SPECIALTY LIST ADULT
Internal Medicine
Cardiology
Cardiology
Hospitalist
Internal Medicine
Hospitalist
Internal Medicine

## 2021-04-06 NOTE — PROGRESS NOTE ADULT - TIME BILLING
Initial attending contact date  4/5/21    . See fellow note written above for details. I reviewed the fellow documentation. I have personally seen and examined this patient. I reviewed vitals, labs, medications, cardiac studies, and additional imaging. I agree with the above fellow's findings and plans as written above with the following additions/statements.      86M w/ dementia, HTN, chronic SPC c/b Sepsis d/t UTI. Cardiology consulted for new onset A fib    -new onset A fib in setting of sepsis  -currently rate controlled on metoprolol tartrate 50 mg bid, can transition to toprol  mg poqd upon dc  -Given uptrending Crt,  decrease eliquis 2.5 mg bid   -Please reconsult as needed  -Can fu with Dr Wilson for outpatient cardiology : [phone]
discussed with primary team
Dispo: pending abx course
Dispo: pending abx course

## 2021-04-06 NOTE — PROGRESS NOTE ADULT - PROBLEM SELECTOR PLAN 4
Acute hypoxic respiratory failure 2/2 COPD exacerbation  - resolved, stable on room air  - c/w duonebs PRN

## 2021-04-06 NOTE — PROGRESS NOTE ADULT - PROBLEM SELECTOR PROBLEM 8
BPH (benign prostatic hyperplasia)
Nutrition, metabolism, and development symptoms
BPH (benign prostatic hyperplasia)
BPH (benign prostatic hyperplasia)
Nutrition, metabolism, and development symptoms

## 2021-04-06 NOTE — DISCHARGE NOTE NURSING/CASE MANAGEMENT/SOCIAL WORK - NSDCFUADDAPPT_GEN_ALL_CORE_FT
Receives care from [facility] (Dr. Helder Suárez and Dr. Chino Gil): These doctors do home visits and has the patient on the schedule for a visit the week of 4/12 as their schedule permits. Will call patient at home to schedule a time. Discharge paperwork to be faxed to [facility] ([phone])

## 2021-04-06 NOTE — DISCHARGE NOTE NURSING/CASE MANAGEMENT/SOCIAL WORK - PATIENT PORTAL LINK FT
You can access the FollowMyHealth Patient Portal offered by [facility] by registering at the following website: [email or web address]. By joining [employer]’s FollowMyHealth portal, you will also be able to view your health information using other applications (apps) compatible with our system.

## 2021-04-06 NOTE — PROGRESS NOTE ADULT - ASSESSMENT
85 yo M w/ dementia, HTN, chronic SPC c/b Sepsis d/t UTI, Found to be in Afib, presumed new, for which Cardiology has been following    1) Atrial Fibrillation, rate Controlled  -Patient remained rate controlled on Lopressor 50 mg PO BID. Can switch to Toprol 100 mg Daily at time of DC  -Patient with CHADVASC of at least 3 placing him at high thromboembolic risk. Recommend decreasing ELiquis dose to 2.5 mg PO BID given continued worsening renal function given patient's advanced age  -Please obtain daily 12 lead EKG  -Please keep K>4 and Mg>2  -Please Reconsult cardiology as needed.       -

## 2021-04-07 VITALS
HEART RATE: 107 BPM | OXYGEN SATURATION: 94 % | TEMPERATURE: 98 F | DIASTOLIC BLOOD PRESSURE: 77 MMHG | SYSTOLIC BLOOD PRESSURE: 113 MMHG | RESPIRATION RATE: 18 BRPM

## 2021-04-07 PROCEDURE — 84443 ASSAY THYROID STIM HORMONE: CPT

## 2021-04-07 PROCEDURE — 85730 THROMBOPLASTIN TIME PARTIAL: CPT

## 2021-04-07 PROCEDURE — 80053 COMPREHEN METABOLIC PANEL: CPT

## 2021-04-07 PROCEDURE — 87077 CULTURE AEROBIC IDENTIFY: CPT

## 2021-04-07 PROCEDURE — 81001 URINALYSIS AUTO W/SCOPE: CPT

## 2021-04-07 PROCEDURE — 71045 X-RAY EXAM CHEST 1 VIEW: CPT

## 2021-04-07 PROCEDURE — 82803 BLOOD GASES ANY COMBINATION: CPT

## 2021-04-07 PROCEDURE — 83880 ASSAY OF NATRIURETIC PEPTIDE: CPT

## 2021-04-07 PROCEDURE — U0005: CPT

## 2021-04-07 PROCEDURE — 83735 ASSAY OF MAGNESIUM: CPT

## 2021-04-07 PROCEDURE — 83605 ASSAY OF LACTIC ACID: CPT

## 2021-04-07 PROCEDURE — 87086 URINE CULTURE/COLONY COUNT: CPT

## 2021-04-07 PROCEDURE — 99285 EMERGENCY DEPT VISIT HI MDM: CPT | Mod: 25

## 2021-04-07 PROCEDURE — U0003: CPT

## 2021-04-07 PROCEDURE — 93005 ELECTROCARDIOGRAM TRACING: CPT

## 2021-04-07 PROCEDURE — 85610 PROTHROMBIN TIME: CPT

## 2021-04-07 PROCEDURE — 99239 HOSP IP/OBS DSCHRG MGMT >30: CPT

## 2021-04-07 PROCEDURE — 93306 TTE W/DOPPLER COMPLETE: CPT

## 2021-04-07 PROCEDURE — 87040 BLOOD CULTURE FOR BACTERIA: CPT

## 2021-04-07 PROCEDURE — 0225U NFCT DS DNA&RNA 21 SARSCOV2: CPT

## 2021-04-07 PROCEDURE — 84100 ASSAY OF PHOSPHORUS: CPT

## 2021-04-07 PROCEDURE — 82962 GLUCOSE BLOOD TEST: CPT

## 2021-04-07 PROCEDURE — 85027 COMPLETE CBC AUTOMATED: CPT

## 2021-04-07 PROCEDURE — 87186 SC STD MICRODIL/AGAR DIL: CPT

## 2021-04-07 PROCEDURE — 85025 COMPLETE CBC W/AUTO DIFF WBC: CPT

## 2021-04-07 PROCEDURE — 94640 AIRWAY INHALATION TREATMENT: CPT

## 2021-04-07 PROCEDURE — 80048 BASIC METABOLIC PNL TOTAL CA: CPT

## 2021-04-07 PROCEDURE — 36415 COLL VENOUS BLD VENIPUNCTURE: CPT

## 2021-04-07 PROCEDURE — 84484 ASSAY OF TROPONIN QUANT: CPT

## 2021-04-07 RX ADMIN — Medication 81 MILLIGRAM(S): at 11:12

## 2021-04-07 RX ADMIN — PIPERACILLIN AND TAZOBACTAM 200 GRAM(S): 4; .5 INJECTION, POWDER, LYOPHILIZED, FOR SOLUTION INTRAVENOUS at 11:11

## 2021-04-07 RX ADMIN — Medication 50 MILLIGRAM(S): at 06:39

## 2021-04-07 RX ADMIN — Medication 3 MILLILITER(S): at 06:38

## 2021-04-07 RX ADMIN — Medication 3 MILLILITER(S): at 11:12

## 2021-04-07 RX ADMIN — APIXABAN 5 MILLIGRAM(S): 2.5 TABLET, FILM COATED ORAL at 06:39

## 2021-04-07 RX ADMIN — PIPERACILLIN AND TAZOBACTAM 200 GRAM(S): 4; .5 INJECTION, POWDER, LYOPHILIZED, FOR SOLUTION INTRAVENOUS at 06:38

## 2021-04-13 DIAGNOSIS — I10 ESSENTIAL (PRIMARY) HYPERTENSION: ICD-10-CM
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
## 2021-04-13 DIAGNOSIS — R33.8 OTHER RETENTION OF URINE: ICD-10-CM

## 2021-04-13 DIAGNOSIS — E87.0 HYPEROSMOLALITY AND HYPERNATREMIA: ICD-10-CM

## 2021-04-13 DIAGNOSIS — J96.01 ACUTE RESPIRATORY FAILURE WITH HYPOXIA: ICD-10-CM

## 2021-04-13 DIAGNOSIS — N39.0 URINARY TRACT INFECTION, SITE NOT SPECIFIED: ICD-10-CM

## 2021-04-13 DIAGNOSIS — N40.1 BENIGN PROSTATIC HYPERPLASIA WITH LOWER URINARY TRACT SYMPTOMS: ICD-10-CM

## 2021-04-13 DIAGNOSIS — J44.9 CHRONIC OBSTRUCTIVE PULMONARY DISEASE, UNSPECIFIED: ICD-10-CM

## 2021-04-13 DIAGNOSIS — N17.9 ACUTE KIDNEY FAILURE, UNSPECIFIED: ICD-10-CM

## 2021-04-13 DIAGNOSIS — I48.91 UNSPECIFIED ATRIAL FIBRILLATION: ICD-10-CM

## 2021-04-13 DIAGNOSIS — T83.511A INFECTION AND INFLAMMATORY REACTION DUE TO INDWELLING URETHRAL CATHETER, INITIAL ENCOUNTER: ICD-10-CM

## 2021-04-13 DIAGNOSIS — A41.9 SEPSIS, UNSPECIFIED ORGANISM: ICD-10-CM

## 2021-04-13 DIAGNOSIS — F03.90 UNSPECIFIED DEMENTIA WITHOUT BEHAVIORAL DISTURBANCE: ICD-10-CM

## 2021-04-13 DIAGNOSIS — R53.1 WEAKNESS: ICD-10-CM

## 2021-12-09 NOTE — DISCHARGE NOTE NURSING/CASE MANAGEMENT/SOCIAL WORK - NSTOBACCONEVERSMOKERY/N_GEN_A
Received medication refill request from pharmacy for montelukast (SINGULAIR) 10 MG tablet. Patient is overdue for a follow up appointment so refill request was denied.    Last refill:  11/24/2020 - dispense #30 with 6 refills.  Last check:  04/29/2021 - Patient was to follow up around 10/29/2021 and did not. No future appointment scheduled.     Yes

## 2022-09-16 NOTE — PROCEDURE NOTE - NSINDICATIONS_GEN_A_CORE
Step 1: Below are statements about things you do in everyday life.  For each statement, check how well you do it.  If an item does not apply to you, cross it out and move to the next item. Step 2: Next, for each statement, [place] how important this is for you. Step 3: Choose up to 4 priority areas which you would like to target for improvement     Example N/A A lot of difficulty Some difficulty Well Extremely well Important More Important Most Important I would like to Prioritize   Taking care of myself [] [] [] [] [] [] [] []    Getting where I need to go [] [] [] [] [] [] [] []    Managing my finances [] [] [] [] [] [] [] []    Managing by basic needs (food, medicine) [] [] [] [] [] [] [] []    Identifying and solving problems [] [] [] [] [] [] [] []    Getting done what I need to do [] [] [] [] [] [] [] []    Having a satisfying routine [] [] [] [] [] [] [] []    Handling my responsibilities [] [] [] [] [] [] [] []    Being involved as a student, worker, volunteer, and/or family member [] [] [] [] [] [] [] []    Working towards my goals [] [] [] [] [] [] [] []    Making decisions based on what I think is important [] [] [] [] [] [] [] []    Effectively using my abilities [] [] [] [] [] [] [] []      Pt was provided the Occupational Self-Assessment to complete and indicate his PERCEIVED COMPETENCE in completing daily life tasks.  Pt did not complete the form.  Per record/intake pt sought admission to address his polysubstance use (alcohol, cocaine, heroin, ecstasy) and SI.  Pt did not appear to be connected with professional providers PTA, denied social supports and has limited support from family.  Basic needs did not appear to be met a pt lacks housing and is unemployed. Means to finance his substance use is not known.  Per hx Pt's physical status appears stable and as a result, pt is likely physically capable of completing routine personal cares and home management tasks independently.  Positive daily routine  and structure appear limited at this time.  While on the unit pt would likely benefit from involvement in programming to address his identified concerns and to support his recovery. He will be encouraged to attend and actively participate in groups.  Flores Cervantes, PRIYANKAR     UTI/continuous bladder irrigation/other

## 2022-12-06 NOTE — ED PROVIDER NOTE - MDM PATIENT STATEMENT FOR ADDL TREATMENT
DISPLAY PLAN FREE TEXT
Patient with one or more new problems requiring additional work-up/treatment.
DISPLAY PLAN FREE TEXT
